# Patient Record
Sex: FEMALE | Race: WHITE | NOT HISPANIC OR LATINO
[De-identification: names, ages, dates, MRNs, and addresses within clinical notes are randomized per-mention and may not be internally consistent; named-entity substitution may affect disease eponyms.]

---

## 2017-01-05 ENCOUNTER — MEDICATION RENEWAL (OUTPATIENT)
Age: 72
End: 2017-01-05

## 2017-01-09 ENCOUNTER — MEDICATION RENEWAL (OUTPATIENT)
Age: 72
End: 2017-01-09

## 2017-01-12 ENCOUNTER — MEDICATION RENEWAL (OUTPATIENT)
Age: 72
End: 2017-01-12

## 2017-01-12 DIAGNOSIS — Z87.09 PERSONAL HISTORY OF OTHER DISEASES OF THE RESPIRATORY SYSTEM: ICD-10-CM

## 2017-01-12 RX ORDER — AZITHROMYCIN 500 MG/1
500 TABLET, FILM COATED ORAL
Qty: 5 | Refills: 0 | Status: ACTIVE | COMMUNITY
Start: 2017-01-12 | End: 1900-01-01

## 2017-02-01 ENCOUNTER — APPOINTMENT (OUTPATIENT)
Dept: PULMONOLOGY | Facility: CLINIC | Age: 72
End: 2017-02-01

## 2017-04-16 ENCOUNTER — INPATIENT (INPATIENT)
Facility: HOSPITAL | Age: 72
LOS: 1 days | Discharge: HOME | DRG: 392 | End: 2017-04-18
Attending: INTERNAL MEDICINE | Admitting: HOSPITALIST
Payer: MEDICARE

## 2017-04-16 VITALS
RESPIRATION RATE: 18 BRPM | TEMPERATURE: 97 F | HEART RATE: 86 BPM | OXYGEN SATURATION: 97 % | DIASTOLIC BLOOD PRESSURE: 92 MMHG | SYSTOLIC BLOOD PRESSURE: 154 MMHG

## 2017-04-16 DIAGNOSIS — Z41.8 ENCOUNTER FOR OTHER PROCEDURES FOR PURPOSES OTHER THAN REMEDYING HEALTH STATE: ICD-10-CM

## 2017-04-16 DIAGNOSIS — G35 MULTIPLE SCLEROSIS: ICD-10-CM

## 2017-04-16 DIAGNOSIS — R63.8 OTHER SYMPTOMS AND SIGNS CONCERNING FOOD AND FLUID INTAKE: ICD-10-CM

## 2017-04-16 DIAGNOSIS — J44.9 CHRONIC OBSTRUCTIVE PULMONARY DISEASE, UNSPECIFIED: ICD-10-CM

## 2017-04-16 DIAGNOSIS — R11.0 NAUSEA: ICD-10-CM

## 2017-04-16 LAB
ALBUMIN SERPL ELPH-MCNC: 4.1 G/DL — SIGNIFICANT CHANGE UP (ref 3.3–5)
ALP SERPL-CCNC: 85 U/L — SIGNIFICANT CHANGE UP (ref 40–120)
ALT FLD-CCNC: 9 U/L RC — LOW (ref 10–45)
ANION GAP SERPL CALC-SCNC: 13 MMOL/L — SIGNIFICANT CHANGE UP (ref 5–17)
ANION GAP SERPL CALC-SCNC: 14 MMOL/L — SIGNIFICANT CHANGE UP (ref 5–17)
APPEARANCE UR: CLEAR — SIGNIFICANT CHANGE UP
APTT BLD: 34.6 SEC — SIGNIFICANT CHANGE UP (ref 27.5–37.4)
AST SERPL-CCNC: 14 U/L — SIGNIFICANT CHANGE UP (ref 10–40)
BASOPHILS # BLD AUTO: 0 K/UL — SIGNIFICANT CHANGE UP (ref 0–0.2)
BASOPHILS NFR BLD AUTO: 0.5 % — SIGNIFICANT CHANGE UP (ref 0–2)
BILIRUB SERPL-MCNC: 0.8 MG/DL — SIGNIFICANT CHANGE UP (ref 0.2–1.2)
BILIRUB UR-MCNC: NEGATIVE — SIGNIFICANT CHANGE UP
BUN SERPL-MCNC: 11 MG/DL — SIGNIFICANT CHANGE UP (ref 7–23)
BUN SERPL-MCNC: 9 MG/DL — SIGNIFICANT CHANGE UP (ref 7–23)
CALCIUM SERPL-MCNC: 8.1 MG/DL — LOW (ref 8.4–10.5)
CALCIUM SERPL-MCNC: 9.5 MG/DL — SIGNIFICANT CHANGE UP (ref 8.4–10.5)
CHLORIDE SERPL-SCNC: 87 MMOL/L — LOW (ref 96–108)
CHLORIDE SERPL-SCNC: 92 MMOL/L — LOW (ref 96–108)
CO2 SERPL-SCNC: 23 MMOL/L — SIGNIFICANT CHANGE UP (ref 22–31)
CO2 SERPL-SCNC: 26 MMOL/L — SIGNIFICANT CHANGE UP (ref 22–31)
COLOR SPEC: YELLOW — SIGNIFICANT CHANGE UP
CREAT SERPL-MCNC: 0.43 MG/DL — LOW (ref 0.5–1.3)
CREAT SERPL-MCNC: 0.5 MG/DL — SIGNIFICANT CHANGE UP (ref 0.5–1.3)
DIFF PNL FLD: ABNORMAL
EOSINOPHIL # BLD AUTO: 0.1 K/UL — SIGNIFICANT CHANGE UP (ref 0–0.5)
EOSINOPHIL NFR BLD AUTO: 1 % — SIGNIFICANT CHANGE UP (ref 0–6)
GAS PNL BLDV: SIGNIFICANT CHANGE UP
GLUCOSE SERPL-MCNC: 76 MG/DL — SIGNIFICANT CHANGE UP (ref 70–99)
GLUCOSE SERPL-MCNC: 96 MG/DL — SIGNIFICANT CHANGE UP (ref 70–99)
GLUCOSE UR QL: NEGATIVE — SIGNIFICANT CHANGE UP
HCT VFR BLD CALC: 34.9 % — SIGNIFICANT CHANGE UP (ref 34.5–45)
HGB BLD-MCNC: 12 G/DL — SIGNIFICANT CHANGE UP (ref 11.5–15.5)
INR BLD: 1.14 RATIO — SIGNIFICANT CHANGE UP (ref 0.88–1.16)
KETONES UR-MCNC: ABNORMAL
LEUKOCYTE ESTERASE UR-ACNC: NEGATIVE — SIGNIFICANT CHANGE UP
LIDOCAIN IGE QN: 25 U/L — SIGNIFICANT CHANGE UP (ref 7–60)
LYMPHOCYTES # BLD AUTO: 0.8 K/UL — LOW (ref 1–3.3)
LYMPHOCYTES # BLD AUTO: 12.2 % — LOW (ref 13–44)
MCHC RBC-ENTMCNC: 31.5 PG — SIGNIFICANT CHANGE UP (ref 27–34)
MCHC RBC-ENTMCNC: 34.4 GM/DL — SIGNIFICANT CHANGE UP (ref 32–36)
MCV RBC AUTO: 91.6 FL — SIGNIFICANT CHANGE UP (ref 80–100)
MONOCYTES # BLD AUTO: 0.4 K/UL — SIGNIFICANT CHANGE UP (ref 0–0.9)
MONOCYTES NFR BLD AUTO: 5.8 % — SIGNIFICANT CHANGE UP (ref 2–14)
NEUTROPHILS # BLD AUTO: 5.3 K/UL — SIGNIFICANT CHANGE UP (ref 1.8–7.4)
NEUTROPHILS NFR BLD AUTO: 80.4 % — HIGH (ref 43–77)
NITRITE UR-MCNC: NEGATIVE — SIGNIFICANT CHANGE UP
PH UR: 6.5 — SIGNIFICANT CHANGE UP (ref 4.8–8)
PLATELET # BLD AUTO: 292 K/UL — SIGNIFICANT CHANGE UP (ref 150–400)
POTASSIUM SERPL-MCNC: 3.6 MMOL/L — SIGNIFICANT CHANGE UP (ref 3.5–5.3)
POTASSIUM SERPL-MCNC: 4.4 MMOL/L — SIGNIFICANT CHANGE UP (ref 3.5–5.3)
POTASSIUM SERPL-SCNC: 3.6 MMOL/L — SIGNIFICANT CHANGE UP (ref 3.5–5.3)
POTASSIUM SERPL-SCNC: 4.4 MMOL/L — SIGNIFICANT CHANGE UP (ref 3.5–5.3)
PROT SERPL-MCNC: 7.8 G/DL — SIGNIFICANT CHANGE UP (ref 6–8.3)
PROT UR-MCNC: SIGNIFICANT CHANGE UP
PROTHROM AB SERPL-ACNC: 12.5 SEC — SIGNIFICANT CHANGE UP (ref 9.8–12.7)
RBC # BLD: 3.81 M/UL — SIGNIFICANT CHANGE UP (ref 3.8–5.2)
RBC # FLD: 13.7 % — SIGNIFICANT CHANGE UP (ref 10.3–14.5)
SODIUM SERPL-SCNC: 127 MMOL/L — LOW (ref 135–145)
SODIUM SERPL-SCNC: 128 MMOL/L — LOW (ref 135–145)
SP GR SPEC: 1.02 — SIGNIFICANT CHANGE UP (ref 1.01–1.02)
UROBILINOGEN FLD QL: NEGATIVE — SIGNIFICANT CHANGE UP
WBC # BLD: 6.6 K/UL — SIGNIFICANT CHANGE UP (ref 3.8–10.5)
WBC # FLD AUTO: 6.6 K/UL — SIGNIFICANT CHANGE UP (ref 3.8–10.5)

## 2017-04-16 PROCEDURE — 93010 ELECTROCARDIOGRAM REPORT: CPT

## 2017-04-16 PROCEDURE — 99285 EMERGENCY DEPT VISIT HI MDM: CPT | Mod: 25

## 2017-04-16 RX ORDER — METOCLOPRAMIDE HCL 10 MG
10 TABLET ORAL ONCE
Qty: 0 | Refills: 0 | Status: COMPLETED | OUTPATIENT
Start: 2017-04-16 | End: 2017-04-16

## 2017-04-16 RX ORDER — SODIUM CHLORIDE 9 MG/ML
1000 INJECTION INTRAMUSCULAR; INTRAVENOUS; SUBCUTANEOUS ONCE
Qty: 0 | Refills: 0 | Status: COMPLETED | OUTPATIENT
Start: 2017-04-16 | End: 2017-04-16

## 2017-04-16 RX ORDER — FLUTICASONE FUROATE AND VILANTEROL TRIFENATATE 100; 25 UG/1; UG/1
1 POWDER RESPIRATORY (INHALATION) DAILY
Qty: 0 | Refills: 0 | Status: DISCONTINUED | OUTPATIENT
Start: 2017-04-16 | End: 2017-04-18

## 2017-04-16 RX ORDER — ACETAMINOPHEN 500 MG
1000 TABLET ORAL ONCE
Qty: 0 | Refills: 0 | Status: COMPLETED | OUTPATIENT
Start: 2017-04-16 | End: 2017-04-16

## 2017-04-16 RX ORDER — ONDANSETRON 8 MG/1
4 TABLET, FILM COATED ORAL EVERY 8 HOURS
Qty: 0 | Refills: 0 | Status: DISCONTINUED | OUTPATIENT
Start: 2017-04-16 | End: 2017-04-18

## 2017-04-16 RX ORDER — SODIUM CHLORIDE 9 MG/ML
1000 INJECTION, SOLUTION INTRAVENOUS
Qty: 0 | Refills: 0 | Status: DISCONTINUED | OUTPATIENT
Start: 2017-04-16 | End: 2017-04-16

## 2017-04-16 RX ORDER — ENOXAPARIN SODIUM 100 MG/ML
30 INJECTION SUBCUTANEOUS DAILY
Qty: 0 | Refills: 0 | Status: DISCONTINUED | OUTPATIENT
Start: 2017-04-16 | End: 2017-04-18

## 2017-04-16 RX ORDER — GABAPENTIN 400 MG/1
1200 CAPSULE ORAL THREE TIMES A DAY
Qty: 0 | Refills: 0 | Status: DISCONTINUED | OUTPATIENT
Start: 2017-04-16 | End: 2017-04-17

## 2017-04-16 RX ORDER — GABAPENTIN 400 MG/1
1800 CAPSULE ORAL
Qty: 0 | Refills: 0 | Status: DISCONTINUED | OUTPATIENT
Start: 2017-04-16 | End: 2017-04-16

## 2017-04-16 RX ORDER — SODIUM CHLORIDE 9 MG/ML
1000 INJECTION, SOLUTION INTRAVENOUS
Qty: 0 | Refills: 0 | Status: DISCONTINUED | OUTPATIENT
Start: 2017-04-16 | End: 2017-04-17

## 2017-04-16 RX ORDER — DIPHENHYDRAMINE HCL 50 MG
25 CAPSULE ORAL ONCE
Qty: 0 | Refills: 0 | Status: COMPLETED | OUTPATIENT
Start: 2017-04-16 | End: 2017-04-16

## 2017-04-16 RX ORDER — ONDANSETRON 8 MG/1
4 TABLET, FILM COATED ORAL ONCE
Qty: 0 | Refills: 0 | Status: COMPLETED | OUTPATIENT
Start: 2017-04-16 | End: 2017-04-16

## 2017-04-16 RX ADMIN — Medication 10 MILLIGRAM(S): at 19:01

## 2017-04-16 RX ADMIN — SODIUM CHLORIDE 1000 MILLILITER(S): 9 INJECTION INTRAMUSCULAR; INTRAVENOUS; SUBCUTANEOUS at 16:28

## 2017-04-16 RX ADMIN — ENOXAPARIN SODIUM 30 MILLIGRAM(S): 100 INJECTION SUBCUTANEOUS at 23:11

## 2017-04-16 RX ADMIN — Medication 400 MILLIGRAM(S): at 19:01

## 2017-04-16 RX ADMIN — SODIUM CHLORIDE 100 MILLILITER(S): 9 INJECTION, SOLUTION INTRAVENOUS at 23:46

## 2017-04-16 RX ADMIN — Medication 1000 MILLIGRAM(S): at 19:18

## 2017-04-16 RX ADMIN — SODIUM CHLORIDE 1000 MILLILITER(S): 9 INJECTION INTRAMUSCULAR; INTRAVENOUS; SUBCUTANEOUS at 19:02

## 2017-04-16 RX ADMIN — SODIUM CHLORIDE 100 MILLILITER(S): 9 INJECTION, SOLUTION INTRAVENOUS at 21:02

## 2017-04-16 RX ADMIN — Medication 101 MILLIGRAM(S): at 19:02

## 2017-04-16 RX ADMIN — ONDANSETRON 4 MILLIGRAM(S): 8 TABLET, FILM COATED ORAL at 23:38

## 2017-04-16 RX ADMIN — ONDANSETRON 4 MILLIGRAM(S): 8 TABLET, FILM COATED ORAL at 19:01

## 2017-04-16 RX ADMIN — ONDANSETRON 4 MILLIGRAM(S): 8 TABLET, FILM COATED ORAL at 19:02

## 2017-04-16 RX ADMIN — GABAPENTIN 1200 MILLIGRAM(S): 400 CAPSULE ORAL at 23:10

## 2017-04-16 NOTE — H&P ADULT. - PROBLEM SELECTOR PLAN 1
unclear etiology. Nausea seems to be intermittent since patient's lung cancer diagnosis and treatments. Gastrointestinal pathologies have been worked up with EGD, CT A/P emptying study. Central pathologies also worked up in past with brain imaging. Question if related to MS? vs post lung cancer tx, cancer nausea  -s/p NS boluses, continue with intravenous hydration  -check BMP for sodium in AM  -continue with zofran ATC  -consult patient's GI Dr. Spring in AM  -benign abdominal exam. doubt intra-abdominal pathology  -pt with soft stools, though not significant intake. would monitor stool count. low suspicion for infectious diarrhea at the moment

## 2017-04-16 NOTE — ED PROVIDER NOTE - MEDICAL DECISION MAKING DETAILS
71 yo female with history of chronic nausea worked up by GI without overt etiology p/w N/V/D x 1d.  Abdomen benign.  Will give IVF, check labs, reassess; suspect viral gastroenteritis exacerbating baseline chronic indigestion/nausea.

## 2017-04-16 NOTE — H&P ADULT. - LAB RESULTS AND INTERPRETATION
WBC 6.6  Hgb&Hct 12.0/34.9  BUN/creatinine 11/0.50  Lipase 25  UA large ketone, small blood, 2-5 WBC, few bacteria

## 2017-04-16 NOTE — H&P ADULT. - HISTORY OF PRESENT ILLNESS
71 y/o F hx of lung cancer s/p RLL lobectomy, RT and chemotherapy (several years ago), COPD, MS not on any treatment presents with one day of nausea.     She reports that she has had several episodes of nausea intermittently of the last few years. She endorses dry heaves intermittently since early this morning. Denies any PO intake. No vomiting. She endorses several episodes of soft stools over the course of the day. Described as soft, not watery.     No fevers, chills, or sweats. She denies sick contacts or recent illness. Endorses 10 pound weight loss over the last year or so, very progressive. She reports that she has had extensive work up including CT A/P which was nonrevealing. She has also had endoscopy, NM gastric emptying study where were also reportedly within normal limits.     She reports that she has been seeing a nutritionist in regards to appropriate foods to eat.     ED Vitals:  Tc 97.7, HR 81-91, -156/84-92, RR 19-20, 98 RA  In the ED, she received Tylenol IV, Benadryl 25, Zofran 4mg x 2, Reglan 10mg x 2, NS bolus x 2

## 2017-04-16 NOTE — ED ADULT NURSE NOTE - PSH
Age related cataract    H/O benign breast biopsy    History of radiation therapy  to chest for lung cancer  S/P lobectomy of lung  right lower lobectomy

## 2017-04-16 NOTE — ED PROVIDER NOTE - PROGRESS NOTE DETAILS
Spoke with Dr. Spring who states that he has performed an extensive work-up on patient and all results have been within normal limits. Dr. Spring reports he agrees with plan to hydrate patient and PO challenge for discharge and follow-up with Dr. Spring tomorrow. -Shaista Malik PA-C Attending MD Moscoso: patient received in signout from Dr Amos pending PO challenge, patient unable to tolerate PO, still with nausea. benign abdominal exam so do not suspect acute surgical pathology, will admit for intractable nausea and failure to tolerate PO

## 2017-04-16 NOTE — ED PROVIDER NOTE - PHYSICAL EXAMINATION
GEN: Well Appearing, Nontoxic, NAD  HEENT: Symm Facies, PERRL, EOMI, MMM, posterior pharynx clear  CV: No JVD/Bruits or stridor;  RRR w/o m/g/r  RESP: CTAB w/o w/r/r  ABD: Soft, nt/nd, +bs, no guarding/rebound, no RUQ tender;  No CVAT  EXT: No lower extremity edema or calf tenderness. WWP, palpable pulses. FROMx4  SKIN: No erythema, lesions or rash  Neuro: Grossly intact, AOX3 with normal speech, CN II-XII intact; Sensation intact, motor 5/5 throughout; gait normal

## 2017-04-16 NOTE — ED PROVIDER NOTE - NS ED ROS FT
Constitutional: No fever or chills  Eyes: No visual changes, eye pain or redness  HEENT: No throat pain, ear pain, nasal pain. No nose bleeding.  CV: No chest pain or lower extremity edema  Resp: No SOB no cough  GI: +mild abdominal pain, dull. +nausea. no vomiting. +3 small episodes of diarrhea this morning. No constipation.   : No dysuria, hematuria.   MSK: No musculoskeletal pain  Skin: No rash  Neuro: No headache. No numbness or tingling. No weakness.

## 2017-04-16 NOTE — PROVIDER CONTACT NOTE (OTHER) - ASSESSMENT
pt axox4. able to make desicisions. pt stated she is DNR and has filled form out in past. no current record in chart.

## 2017-04-16 NOTE — ED PROVIDER NOTE - OBJECTIVE STATEMENT
73 y/o F pmhx MS, COPD, lung cancer s/p partial RLL lobectomy and RT, presenting to ED for nausea and dry heaving. Pt reports that she has seen Dr. John Spring for multiple gastrointestinal issues, 'through all testing have been unable to find a diagnosis,' as patient frequently has nausea and vague abdominal cramping/pain. She states that the symptoms she is having today are different from her baseline symptoms, as 'her nausea usually does not last this long' and she has been dry-heaving. Reports 3-4 episodes of diarrhea this morning; small amounts each time, no blood. She denies any vomiting. States that she ate salad last night with mozzarella for the first time in months because 'her nutritionist told her to avoid salad and soup but recently told her she could try to have it again.' She denies any other new foods, recent travel or known sick contacts. Denies fevers, chills. 71 y/o F pmhx MS, COPD, lung cancer s/p partial RLL lobectomy and RT, presenting to ED for nausea and dry heaving. Pt reports that she has seen Dr. John Spring for multiple gastrointestinal issues, 'through all testing have been unable to find a diagnosis,' as patient frequently has nausea and vague abdominal cramping/pain. Has had extensive work-up with Dr. Spring. She states that the symptoms she is having today are different from her baseline symptoms, as 'her nausea usually does not last this long' and she has been dry-heaving. Reports 3-4 episodes of diarrhea this morning; small amounts each time, no blood. She denies any vomiting. States that she ate salad last night with mozzarella for the first time in months because 'her nutritionist told her to avoid salad and soup but recently told her she could try to have it again.' She denies any other new foods, recent travel or known sick contacts. Denies fevers, chills.

## 2017-04-16 NOTE — ED PROVIDER NOTE - INTERPRETATION
EKG reviewed for rate, rhythm, axis, intervals and segments, including QRS morphology, P wave appearance T wave appearance, RI interval, and QT interval.  I find the EKG to be unremarkable in all of these regards except as follows: 1st deg AVB, L axis, poor R progression

## 2017-04-16 NOTE — ED ADULT NURSE NOTE - OBJECTIVE STATEMENT
pt presents with nausea and abd discomfort since this am, pt states, "I was dry heaving however did not vomit", abd soft, NT, ND, pos diarrhea upon waking, poor appetite, pt A;Ox3, no acute resp distress noted, v/s stable, voids freely, no UTI symptoms noted, ambulates with assistance due to feeling of weakness, safety precautions maintained, fam.mem at bedside, MD byrd, labs sent, will continue to monitor

## 2017-04-17 ENCOUNTER — TRANSCRIPTION ENCOUNTER (OUTPATIENT)
Age: 72
End: 2017-04-17

## 2017-04-17 LAB
ALBUMIN SERPL ELPH-MCNC: 3.2 G/DL — LOW (ref 3.3–5)
ALP SERPL-CCNC: 67 U/L — SIGNIFICANT CHANGE UP (ref 40–120)
ALT FLD-CCNC: 11 U/L — SIGNIFICANT CHANGE UP (ref 10–45)
ANION GAP SERPL CALC-SCNC: 14 MMOL/L — SIGNIFICANT CHANGE UP (ref 5–17)
AST SERPL-CCNC: 13 U/L — SIGNIFICANT CHANGE UP (ref 10–40)
BILIRUB SERPL-MCNC: 0.3 MG/DL — SIGNIFICANT CHANGE UP (ref 0.2–1.2)
BUN SERPL-MCNC: 6 MG/DL — LOW (ref 7–23)
CALCIUM SERPL-MCNC: 8.5 MG/DL — SIGNIFICANT CHANGE UP (ref 8.4–10.5)
CHLORIDE SERPL-SCNC: 96 MMOL/L — SIGNIFICANT CHANGE UP (ref 96–108)
CO2 SERPL-SCNC: 21 MMOL/L — LOW (ref 22–31)
CREAT SERPL-MCNC: 0.48 MG/DL — LOW (ref 0.5–1.3)
CULTURE RESULTS: NO GROWTH — SIGNIFICANT CHANGE UP
GLUCOSE SERPL-MCNC: 104 MG/DL — HIGH (ref 70–99)
HCT VFR BLD CALC: 31.5 % — LOW (ref 34.5–45)
HGB BLD-MCNC: 10.4 G/DL — LOW (ref 11.5–15.5)
MCHC RBC-ENTMCNC: 29.5 PG — SIGNIFICANT CHANGE UP (ref 27–34)
MCHC RBC-ENTMCNC: 33 GM/DL — SIGNIFICANT CHANGE UP (ref 32–36)
MCV RBC AUTO: 89.2 FL — SIGNIFICANT CHANGE UP (ref 80–100)
PLATELET # BLD AUTO: 299 K/UL — SIGNIFICANT CHANGE UP (ref 150–400)
POTASSIUM SERPL-MCNC: 3.4 MMOL/L — LOW (ref 3.5–5.3)
POTASSIUM SERPL-SCNC: 3.4 MMOL/L — LOW (ref 3.5–5.3)
PROT SERPL-MCNC: 6.4 G/DL — SIGNIFICANT CHANGE UP (ref 6–8.3)
RBC # BLD: 3.53 M/UL — LOW (ref 3.8–5.2)
RBC # FLD: 14 % — SIGNIFICANT CHANGE UP (ref 10.3–14.5)
SODIUM SERPL-SCNC: 131 MMOL/L — LOW (ref 135–145)
SPECIMEN SOURCE: SIGNIFICANT CHANGE UP
WBC # BLD: 3.99 K/UL — SIGNIFICANT CHANGE UP (ref 3.8–10.5)
WBC # FLD AUTO: 3.99 K/UL — SIGNIFICANT CHANGE UP (ref 3.8–10.5)

## 2017-04-17 RX ORDER — SODIUM CHLORIDE 9 MG/ML
1000 INJECTION, SOLUTION INTRAVENOUS
Qty: 0 | Refills: 0 | Status: DISCONTINUED | OUTPATIENT
Start: 2017-04-17 | End: 2017-04-17

## 2017-04-17 RX ORDER — ACETAMINOPHEN 500 MG
650 TABLET ORAL EVERY 6 HOURS
Qty: 0 | Refills: 0 | Status: DISCONTINUED | OUTPATIENT
Start: 2017-04-17 | End: 2017-04-18

## 2017-04-17 RX ORDER — GABAPENTIN 400 MG/1
1800 CAPSULE ORAL
Qty: 0 | Refills: 0 | Status: DISCONTINUED | OUTPATIENT
Start: 2017-04-17 | End: 2017-04-18

## 2017-04-17 RX ORDER — POTASSIUM CHLORIDE 20 MEQ
40 PACKET (EA) ORAL ONCE
Qty: 0 | Refills: 0 | Status: COMPLETED | OUTPATIENT
Start: 2017-04-17 | End: 2017-04-17

## 2017-04-17 RX ORDER — GABAPENTIN 400 MG/1
1200 CAPSULE ORAL THREE TIMES A DAY
Qty: 0 | Refills: 0 | Status: DISCONTINUED | OUTPATIENT
Start: 2017-04-17 | End: 2017-04-17

## 2017-04-17 RX ORDER — GABAPENTIN 400 MG/1
3 CAPSULE ORAL
Qty: 0 | Refills: 0 | COMMUNITY
Start: 2017-04-17

## 2017-04-17 RX ADMIN — Medication 650 MILLIGRAM(S): at 12:47

## 2017-04-17 RX ADMIN — Medication 40 MILLIEQUIVALENT(S): at 18:04

## 2017-04-17 RX ADMIN — GABAPENTIN 1800 MILLIGRAM(S): 400 CAPSULE ORAL at 10:22

## 2017-04-17 RX ADMIN — FLUTICASONE FUROATE AND VILANTEROL TRIFENATATE 1 PUFF(S): 100; 25 POWDER RESPIRATORY (INHALATION) at 18:02

## 2017-04-17 RX ADMIN — Medication 650 MILLIGRAM(S): at 10:30

## 2017-04-17 RX ADMIN — GABAPENTIN 1800 MILLIGRAM(S): 400 CAPSULE ORAL at 22:00

## 2017-04-17 RX ADMIN — ENOXAPARIN SODIUM 30 MILLIGRAM(S): 100 INJECTION SUBCUTANEOUS at 14:31

## 2017-04-17 NOTE — PROVIDER CONTACT NOTE (MEDICATION) - ASSESSMENT
pt axox4. VSS. pt taking gabapentin suspension, pt states that she becomes more nauseous when taking suspension and usually takes capsules.

## 2017-04-17 NOTE — DIETITIAN INITIAL EVALUATION ADULT. - FACTORS AFF FOOD INTAKE
Pt reports taste and food preference changes since receiving chemotherapy. Pt with intermittent nausea./change in sense of smell or taste

## 2017-04-17 NOTE — DIETITIAN INITIAL EVALUATION ADULT. - ENERGY NEEDS
ht.59inches wt. 79.5pounds BMI:16.07 kg/m2 IBW: 98pounds (+/-10%) %IBW: 81%  Pt is 71 yo F with hx of COPD, lung cancer, MS, p/w intractable nausea of unclear etiology x1 day.  No Edema noted  No Pressure Ulcers noted

## 2017-04-17 NOTE — DIETITIAN INITIAL EVALUATION ADULT. - OTHER INFO
Pt seen for unintentional weight loss and N/V PTA. Pt reports UBW of 88-90pounds at last doctor's visit 1 month ago. Pt with 10% wt loss in one month. Pt stated her pants are larger as UBW previously 105 pounds prior to becoming sick. Pt reports she did not consume food after she became nauseas on Saturday afternoon after consuming a salad with buttered Italian bread. Pt reports having worse nausea Sunday which led to her admission 4/16. Pt stated she has intermittent nausea with decreased PO intake until it passes. Pt reports nausea earlier this morning after consuming beef broth, tea, juice, and lemon ice, not nauseas at time of visit. Pt denies any emesis. Pt reports baseline constipation that has improved with increase of fiber intake. Pt reports loose stool while in house. Pt with BMx3 today. Pt denies any chewing/swallowing difficulties.

## 2017-04-17 NOTE — DISCHARGE NOTE ADULT - HOSPITAL COURSE
md 71 y/o F hx of lung cancer s/p RLL lobectomy, RT and chemotherapy (several years ago), COPD, MS not on any treatment presents with one day of nausea. She endorses dry heaves intermittently since early this morning.     Dx intractable nausea unclear etiology   Course:   #Hyponatremia :likely cause for acute on chronic nausea. Na 127 on admission improved with ivNS. likely dilutional, either hypovolemic/euvolemic, pt with absolutely no salt intake in diet. advised to increase salt in diet  antiemetics given atc with improvement in nausea  pt to f/u pcp and rpt bmp in 1wk and if persistant hyponatremia-consider furthur w/u    #Nausea seems to be intermittent since patient's lung cancer diagnosis and treatments. Gastrointestinal pathologies have been worked up with EGD, CT A/P and emptying study. Central pathologies also worked up in past with brain imaging.   out pt Gi f/u    pt clinically improved and dced with outpt pcp and gi f/u

## 2017-04-17 NOTE — DISCHARGE NOTE ADULT - MEDICATION SUMMARY - MEDICATIONS TO TAKE
I will START or STAY ON the medications listed below when I get home from the hospital:    gabapentin 600 mg oral tablet  -- 3 tab(s) by mouth 2 times a day  -- Indication: For pain    ondansetron 4 mg oral tablet  -- 1 tab(s) by mouth every 8 hours, As Needed  -- Indication: For Nausea    Breo Ellipta 100 mcg-25 mcg/inh inhalation powder  -- 1 puff(s) inhaled once a day  -- Indication: For COPD (chronic obstructive pulmonary disease)    Os-Adán 500 + D  -- 1 tab(s) by mouth once a day  -- Indication: For supplement    Vitamin D3 1000 intl units oral tablet  -- 1 tab(s) by mouth once a day  -- Indication: For supplement

## 2017-04-17 NOTE — DIETITIAN INITIAL EVALUATION ADULT. - NS AS NUTRI INTERV ED CONTENT
Discussed with pt increasing intake through supplements and snacks. Pt to follow up with outside nutritionist after discharge. Discussed consuming caloric dense foods first to promote good caloric intake.

## 2017-04-17 NOTE — DIETITIAN INITIAL EVALUATION ADULT. - ORAL INTAKE PTA
Pt reports seeing a nutritionist PTA. Pt reports consuming 3meals+1snack/day. Breakfast unusually consists of oatmeal or raisin brain and coffee. Pt reports lunch of turkey and cheese on 1 slice whole wheat bread with a fruit cup or applesauce. Pt's dinner consists of fish or chicken, a starch, and a vegetable. Snack usually consists of pretzels, fig newtons, peanut butter, or prunes. Per pt and pt's daughter, pt consumes small portions. Pt is lactose intolerant and drinks 2% lactacid milk. Pt drinks water and juice. Pt reports trying boost high protein, but didn't like it. Pt reports taking a calcium+Vitamin D3 and additional Vitamin D3. Pt reports shellfish allergy with stomach pains lasting 4 hours./fair

## 2017-04-17 NOTE — DIETITIAN INITIAL EVALUATION ADULT. - ADHERENCE
Pt reports per outside nutritionist, pt avoids consuming soups and salads. She also eats vegetables last at meals./fair

## 2017-04-17 NOTE — DIETITIAN INITIAL EVALUATION ADULT. - NUTRITION INTERVENTION
Meals and Snack/Medical Food Supplements/Collaboration and Referral of Nutrition Care/Nutrition Education

## 2017-04-17 NOTE — DISCHARGE NOTE ADULT - CARE PLAN
Principal Discharge DX:	Nausea  Goal:	Free from reoccurrence of symptoms  Instructions for follow-up, activity and diet:	Resolved  Secondary Diagnosis:	MS (multiple sclerosis)  Instructions for follow-up, activity and diet:	Followup with your doctor  Secondary Diagnosis:	COPD (chronic obstructive pulmonary disease)  Instructions for follow-up, activity and diet:	Continue with your inhaler

## 2017-04-17 NOTE — DISCHARGE NOTE ADULT - CARE PROVIDERS DIRECT ADDRESSES
,[email or web address],DirectAddress_Unknown,DirectAddress_Unknown

## 2017-04-17 NOTE — DIETITIAN INITIAL EVALUATION ADULT. - NS AS NUTRI INTERV MEDICAL AND FOOD SUPPLEMENTS
Pt open to trying different flavors of Ensure Enlive, recommend Ensure Enlive 3x/day. Discussed ways to encourage intake, freezing ensure or chilling ensure./Commercial beverage

## 2017-04-18 VITALS
TEMPERATURE: 98 F | RESPIRATION RATE: 18 BRPM | DIASTOLIC BLOOD PRESSURE: 71 MMHG | OXYGEN SATURATION: 94 % | SYSTOLIC BLOOD PRESSURE: 125 MMHG | HEART RATE: 77 BPM

## 2017-04-18 LAB
ANION GAP SERPL CALC-SCNC: 12 MMOL/L — SIGNIFICANT CHANGE UP (ref 5–17)
BUN SERPL-MCNC: 5 MG/DL — LOW (ref 7–23)
CALCIUM SERPL-MCNC: 8.9 MG/DL — SIGNIFICANT CHANGE UP (ref 8.4–10.5)
CHLORIDE SERPL-SCNC: 97 MMOL/L — SIGNIFICANT CHANGE UP (ref 96–108)
CO2 SERPL-SCNC: 22 MMOL/L — SIGNIFICANT CHANGE UP (ref 22–31)
CREAT SERPL-MCNC: 0.56 MG/DL — SIGNIFICANT CHANGE UP (ref 0.5–1.3)
GLUCOSE SERPL-MCNC: 76 MG/DL — SIGNIFICANT CHANGE UP (ref 70–99)
MAGNESIUM SERPL-MCNC: 1.6 MG/DL — SIGNIFICANT CHANGE UP (ref 1.6–2.6)
POTASSIUM SERPL-MCNC: 4.5 MMOL/L — SIGNIFICANT CHANGE UP (ref 3.5–5.3)
POTASSIUM SERPL-SCNC: 4.5 MMOL/L — SIGNIFICANT CHANGE UP (ref 3.5–5.3)
SODIUM SERPL-SCNC: 131 MMOL/L — LOW (ref 135–145)

## 2017-04-18 PROCEDURE — 82947 ASSAY GLUCOSE BLOOD QUANT: CPT

## 2017-04-18 PROCEDURE — 83605 ASSAY OF LACTIC ACID: CPT

## 2017-04-18 PROCEDURE — 83735 ASSAY OF MAGNESIUM: CPT

## 2017-04-18 PROCEDURE — 81001 URINALYSIS AUTO W/SCOPE: CPT

## 2017-04-18 PROCEDURE — 99285 EMERGENCY DEPT VISIT HI MDM: CPT | Mod: 25

## 2017-04-18 PROCEDURE — 80053 COMPREHEN METABOLIC PANEL: CPT

## 2017-04-18 PROCEDURE — 93005 ELECTROCARDIOGRAM TRACING: CPT

## 2017-04-18 PROCEDURE — 82330 ASSAY OF CALCIUM: CPT

## 2017-04-18 PROCEDURE — 84295 ASSAY OF SERUM SODIUM: CPT

## 2017-04-18 PROCEDURE — 82803 BLOOD GASES ANY COMBINATION: CPT

## 2017-04-18 PROCEDURE — 85730 THROMBOPLASTIN TIME PARTIAL: CPT

## 2017-04-18 PROCEDURE — 96374 THER/PROPH/DIAG INJ IV PUSH: CPT

## 2017-04-18 PROCEDURE — 82435 ASSAY OF BLOOD CHLORIDE: CPT

## 2017-04-18 PROCEDURE — 85610 PROTHROMBIN TIME: CPT

## 2017-04-18 PROCEDURE — 96375 TX/PRO/DX INJ NEW DRUG ADDON: CPT

## 2017-04-18 PROCEDURE — 84132 ASSAY OF SERUM POTASSIUM: CPT

## 2017-04-18 PROCEDURE — 85014 HEMATOCRIT: CPT

## 2017-04-18 PROCEDURE — 83690 ASSAY OF LIPASE: CPT

## 2017-04-18 PROCEDURE — 80048 BASIC METABOLIC PNL TOTAL CA: CPT

## 2017-04-18 PROCEDURE — 87086 URINE CULTURE/COLONY COUNT: CPT

## 2017-04-18 PROCEDURE — 94640 AIRWAY INHALATION TREATMENT: CPT

## 2017-04-18 PROCEDURE — 85027 COMPLETE CBC AUTOMATED: CPT

## 2017-04-18 RX ADMIN — FLUTICASONE FUROATE AND VILANTEROL TRIFENATATE 1 PUFF(S): 100; 25 POWDER RESPIRATORY (INHALATION) at 11:00

## 2017-04-18 RX ADMIN — GABAPENTIN 1800 MILLIGRAM(S): 400 CAPSULE ORAL at 09:29

## 2017-07-29 ENCOUNTER — INPATIENT (INPATIENT)
Facility: HOSPITAL | Age: 72
LOS: 1 days | Discharge: HOME | DRG: 391 | End: 2017-07-31
Attending: INTERNAL MEDICINE | Admitting: INTERNAL MEDICINE
Payer: MEDICARE

## 2017-07-29 VITALS
TEMPERATURE: 98 F | HEART RATE: 95 BPM | SYSTOLIC BLOOD PRESSURE: 151 MMHG | RESPIRATION RATE: 20 BRPM | OXYGEN SATURATION: 97 % | DIASTOLIC BLOOD PRESSURE: 78 MMHG

## 2017-07-29 DIAGNOSIS — E87.1 HYPO-OSMOLALITY AND HYPONATREMIA: ICD-10-CM

## 2017-07-29 DIAGNOSIS — E86.0 DEHYDRATION: ICD-10-CM

## 2017-07-29 DIAGNOSIS — G35 MULTIPLE SCLEROSIS: ICD-10-CM

## 2017-07-29 DIAGNOSIS — R11.0 NAUSEA: ICD-10-CM

## 2017-07-29 DIAGNOSIS — J44.9 CHRONIC OBSTRUCTIVE PULMONARY DISEASE, UNSPECIFIED: ICD-10-CM

## 2017-07-29 DIAGNOSIS — C34.90 MALIGNANT NEOPLASM OF UNSPECIFIED PART OF UNSPECIFIED BRONCHUS OR LUNG: ICD-10-CM

## 2017-07-29 LAB
ALBUMIN SERPL ELPH-MCNC: 3.9 G/DL — SIGNIFICANT CHANGE UP (ref 3.3–5)
ALP SERPL-CCNC: 73 U/L — SIGNIFICANT CHANGE UP (ref 40–120)
ALT FLD-CCNC: 7 U/L RC — LOW (ref 10–45)
ANION GAP SERPL CALC-SCNC: 16 MMOL/L — SIGNIFICANT CHANGE UP (ref 5–17)
ANION GAP SERPL CALC-SCNC: 18 MMOL/L — HIGH (ref 5–17)
APPEARANCE UR: CLEAR — SIGNIFICANT CHANGE UP
AST SERPL-CCNC: 15 U/L — SIGNIFICANT CHANGE UP (ref 10–40)
BASOPHILS # BLD AUTO: 0 K/UL — SIGNIFICANT CHANGE UP (ref 0–0.2)
BASOPHILS NFR BLD AUTO: 0 % — SIGNIFICANT CHANGE UP (ref 0–2)
BILIRUB SERPL-MCNC: 0.6 MG/DL — SIGNIFICANT CHANGE UP (ref 0.2–1.2)
BILIRUB UR-MCNC: NEGATIVE — SIGNIFICANT CHANGE UP
BUN SERPL-MCNC: 5 MG/DL — LOW (ref 7–23)
BUN SERPL-MCNC: 9 MG/DL — SIGNIFICANT CHANGE UP (ref 7–23)
CALCIUM SERPL-MCNC: 8.3 MG/DL — LOW (ref 8.4–10.5)
CALCIUM SERPL-MCNC: 8.7 MG/DL — SIGNIFICANT CHANGE UP (ref 8.4–10.5)
CHLORIDE SERPL-SCNC: 87 MMOL/L — LOW (ref 96–108)
CHLORIDE SERPL-SCNC: 88 MMOL/L — LOW (ref 96–108)
CO2 SERPL-SCNC: 20 MMOL/L — LOW (ref 22–31)
CO2 SERPL-SCNC: 21 MMOL/L — LOW (ref 22–31)
COLOR SPEC: SIGNIFICANT CHANGE UP
CREAT SERPL-MCNC: 0.54 MG/DL — SIGNIFICANT CHANGE UP (ref 0.5–1.3)
CREAT SERPL-MCNC: 0.55 MG/DL — SIGNIFICANT CHANGE UP (ref 0.5–1.3)
DIFF PNL FLD: NEGATIVE — SIGNIFICANT CHANGE UP
EOSINOPHIL # BLD AUTO: 0.1 K/UL — SIGNIFICANT CHANGE UP (ref 0–0.5)
EOSINOPHIL NFR BLD AUTO: 1.1 % — SIGNIFICANT CHANGE UP (ref 0–6)
GAS PNL BLDV: SIGNIFICANT CHANGE UP
GLUCOSE SERPL-MCNC: 238 MG/DL — HIGH (ref 70–99)
GLUCOSE SERPL-MCNC: 89 MG/DL — SIGNIFICANT CHANGE UP (ref 70–99)
GLUCOSE UR QL: NEGATIVE — SIGNIFICANT CHANGE UP
HCT VFR BLD CALC: 35.1 % — SIGNIFICANT CHANGE UP (ref 34.5–45)
HGB BLD-MCNC: 11.6 G/DL — SIGNIFICANT CHANGE UP (ref 11.5–15.5)
KETONES UR-MCNC: ABNORMAL
LEUKOCYTE ESTERASE UR-ACNC: NEGATIVE — SIGNIFICANT CHANGE UP
LYMPHOCYTES # BLD AUTO: 0.6 K/UL — LOW (ref 1–3.3)
LYMPHOCYTES # BLD AUTO: 13.2 % — SIGNIFICANT CHANGE UP (ref 13–44)
MAGNESIUM SERPL-MCNC: 2 MG/DL — SIGNIFICANT CHANGE UP (ref 1.6–2.6)
MCHC RBC-ENTMCNC: 29.1 PG — SIGNIFICANT CHANGE UP (ref 27–34)
MCHC RBC-ENTMCNC: 32.9 GM/DL — SIGNIFICANT CHANGE UP (ref 32–36)
MCV RBC AUTO: 88.3 FL — SIGNIFICANT CHANGE UP (ref 80–100)
MONOCYTES # BLD AUTO: 0.3 K/UL — SIGNIFICANT CHANGE UP (ref 0–0.9)
MONOCYTES NFR BLD AUTO: 5.6 % — SIGNIFICANT CHANGE UP (ref 2–14)
NEUTROPHILS # BLD AUTO: 3.9 K/UL — SIGNIFICANT CHANGE UP (ref 1.8–7.4)
NEUTROPHILS NFR BLD AUTO: 80.1 % — HIGH (ref 43–77)
NITRITE UR-MCNC: NEGATIVE — SIGNIFICANT CHANGE UP
PH UR: 7.5 — SIGNIFICANT CHANGE UP (ref 5–8)
PHOSPHATE SERPL-MCNC: 2.4 MG/DL — LOW (ref 2.5–4.5)
PLATELET # BLD AUTO: 322 K/UL — SIGNIFICANT CHANGE UP (ref 150–400)
POTASSIUM SERPL-MCNC: 3.6 MMOL/L — SIGNIFICANT CHANGE UP (ref 3.5–5.3)
POTASSIUM SERPL-MCNC: 4.3 MMOL/L — SIGNIFICANT CHANGE UP (ref 3.5–5.3)
POTASSIUM SERPL-SCNC: 3.6 MMOL/L — SIGNIFICANT CHANGE UP (ref 3.5–5.3)
POTASSIUM SERPL-SCNC: 4.3 MMOL/L — SIGNIFICANT CHANGE UP (ref 3.5–5.3)
PROT SERPL-MCNC: 8.1 G/DL — SIGNIFICANT CHANGE UP (ref 6–8.3)
PROT UR-MCNC: NEGATIVE — SIGNIFICANT CHANGE UP
RBC # BLD: 3.98 M/UL — SIGNIFICANT CHANGE UP (ref 3.8–5.2)
RBC # FLD: 14.3 % — SIGNIFICANT CHANGE UP (ref 10.3–14.5)
SODIUM SERPL-SCNC: 124 MMOL/L — LOW (ref 135–145)
SODIUM SERPL-SCNC: 126 MMOL/L — LOW (ref 135–145)
SP GR SPEC: 1.01 — SIGNIFICANT CHANGE UP (ref 1.01–1.02)
UROBILINOGEN FLD QL: NEGATIVE — SIGNIFICANT CHANGE UP
WBC # BLD: 4.9 K/UL — SIGNIFICANT CHANGE UP (ref 3.8–10.5)
WBC # FLD AUTO: 4.9 K/UL — SIGNIFICANT CHANGE UP (ref 3.8–10.5)

## 2017-07-29 PROCEDURE — 99285 EMERGENCY DEPT VISIT HI MDM: CPT | Mod: 25,GC

## 2017-07-29 PROCEDURE — 99223 1ST HOSP IP/OBS HIGH 75: CPT

## 2017-07-29 PROCEDURE — 93010 ELECTROCARDIOGRAM REPORT: CPT

## 2017-07-29 RX ORDER — DOCUSATE SODIUM 100 MG
100 CAPSULE ORAL THREE TIMES A DAY
Qty: 0 | Refills: 0 | Status: DISCONTINUED | OUTPATIENT
Start: 2017-07-29 | End: 2017-07-29

## 2017-07-29 RX ORDER — SODIUM CHLORIDE 9 MG/ML
500 INJECTION INTRAMUSCULAR; INTRAVENOUS; SUBCUTANEOUS ONCE
Qty: 0 | Refills: 0 | Status: COMPLETED | OUTPATIENT
Start: 2017-07-29 | End: 2017-07-29

## 2017-07-29 RX ORDER — ONDANSETRON 8 MG/1
1 TABLET, FILM COATED ORAL
Qty: 0 | Refills: 0 | COMMUNITY

## 2017-07-29 RX ORDER — SENNA PLUS 8.6 MG/1
2 TABLET ORAL AT BEDTIME
Qty: 0 | Refills: 0 | Status: DISCONTINUED | OUTPATIENT
Start: 2017-07-29 | End: 2017-07-29

## 2017-07-29 RX ORDER — ENOXAPARIN SODIUM 100 MG/ML
40 INJECTION SUBCUTANEOUS EVERY 24 HOURS
Qty: 0 | Refills: 0 | Status: DISCONTINUED | OUTPATIENT
Start: 2017-07-29 | End: 2017-07-31

## 2017-07-29 RX ORDER — FLUTICASONE FUROATE AND VILANTEROL TRIFENATATE 100; 25 UG/1; UG/1
1 POWDER RESPIRATORY (INHALATION) DAILY
Qty: 0 | Refills: 0 | Status: DISCONTINUED | OUTPATIENT
Start: 2017-07-29 | End: 2017-07-31

## 2017-07-29 RX ORDER — ONDANSETRON 8 MG/1
4 TABLET, FILM COATED ORAL ONCE
Qty: 0 | Refills: 0 | Status: COMPLETED | OUTPATIENT
Start: 2017-07-29 | End: 2017-07-29

## 2017-07-29 RX ORDER — ONDANSETRON 8 MG/1
8 TABLET, FILM COATED ORAL EVERY 6 HOURS
Qty: 0 | Refills: 0 | Status: DISCONTINUED | OUTPATIENT
Start: 2017-07-29 | End: 2017-07-31

## 2017-07-29 RX ORDER — POLYETHYLENE GLYCOL 3350 17 G/17G
17 POWDER, FOR SOLUTION ORAL
Qty: 0 | Refills: 0 | Status: DISCONTINUED | OUTPATIENT
Start: 2017-07-29 | End: 2017-07-29

## 2017-07-29 RX ORDER — METOCLOPRAMIDE HCL 10 MG
10 TABLET ORAL EVERY 6 HOURS
Qty: 0 | Refills: 0 | Status: DISCONTINUED | OUTPATIENT
Start: 2017-07-29 | End: 2017-07-31

## 2017-07-29 RX ORDER — CHOLECALCIFEROL (VITAMIN D3) 125 MCG
1000 CAPSULE ORAL DAILY
Qty: 0 | Refills: 0 | Status: DISCONTINUED | OUTPATIENT
Start: 2017-07-29 | End: 2017-07-31

## 2017-07-29 RX ORDER — L.ACIDOPH/B.ANIMALIS/B.LONGUM 15B CELL
1 CAPSULE ORAL
Qty: 0 | Refills: 0 | COMMUNITY

## 2017-07-29 RX ORDER — GABAPENTIN 400 MG/1
1800 CAPSULE ORAL
Qty: 0 | Refills: 0 | Status: DISCONTINUED | OUTPATIENT
Start: 2017-07-29 | End: 2017-07-31

## 2017-07-29 RX ORDER — PROCHLORPERAZINE MALEATE 5 MG
10 TABLET ORAL ONCE
Qty: 0 | Refills: 0 | Status: COMPLETED | OUTPATIENT
Start: 2017-07-29 | End: 2017-07-29

## 2017-07-29 RX ORDER — SODIUM CHLORIDE 9 MG/ML
1000 INJECTION, SOLUTION INTRAVENOUS
Qty: 0 | Refills: 0 | Status: DISCONTINUED | OUTPATIENT
Start: 2017-07-29 | End: 2017-07-30

## 2017-07-29 RX ADMIN — SODIUM CHLORIDE 500 MILLILITER(S): 9 INJECTION INTRAMUSCULAR; INTRAVENOUS; SUBCUTANEOUS at 09:42

## 2017-07-29 RX ADMIN — GABAPENTIN 1800 MILLIGRAM(S): 400 CAPSULE ORAL at 18:44

## 2017-07-29 RX ADMIN — Medication 10 MILLIGRAM(S): at 18:58

## 2017-07-29 RX ADMIN — SODIUM CHLORIDE 125 MILLILITER(S): 9 INJECTION, SOLUTION INTRAVENOUS at 12:47

## 2017-07-29 RX ADMIN — ONDANSETRON 8 MILLIGRAM(S): 8 TABLET, FILM COATED ORAL at 16:16

## 2017-07-29 RX ADMIN — SODIUM CHLORIDE 125 MILLILITER(S): 9 INJECTION, SOLUTION INTRAVENOUS at 19:04

## 2017-07-29 RX ADMIN — Medication 10 MILLIGRAM(S): at 10:59

## 2017-07-29 RX ADMIN — SODIUM CHLORIDE 500 MILLILITER(S): 9 INJECTION INTRAMUSCULAR; INTRAVENOUS; SUBCUTANEOUS at 12:47

## 2017-07-29 RX ADMIN — ONDANSETRON 4 MILLIGRAM(S): 8 TABLET, FILM COATED ORAL at 09:42

## 2017-07-29 RX ADMIN — ENOXAPARIN SODIUM 40 MILLIGRAM(S): 100 INJECTION SUBCUTANEOUS at 14:01

## 2017-07-29 NOTE — H&P ADULT - NSCORESITESY/N_GEN_A_CORE_RD
No
pt. with no reported PMH presented with c/o Lt arm and leg numbness for 2 hours. pt. denies chest pain, shortness of breath, n/v, dizziness, vision changes; no sensory deficit no weakness noted.

## 2017-07-29 NOTE — H&P ADULT - HISTORY OF PRESENT ILLNESS
73 yo woman with PMH of MS on gabapentin, COPD, lung CA s/p lobectomy/xrt 4 yrs ago a/w nausea.  Pt has had episodic nausea for about a year.  Had a negative EGD and gastric emptying study.  Had a negative brain MRI last week.  This episode started yesterday, unable to take anything PO, trying zofran but not working.  Had a "stomach-ache" but only after nausea started.  Minimal diarrhea.  No fevers, chills, SOB, chest pain.

## 2017-07-29 NOTE — H&P ADULT - ASSESSMENT
73 yo woman with PMH of lung CA, MS a/w acute on chronic nausea found to be hypernatremic to 126.  Nausea has been worked up extensively in past without etiology other than possible sequalae of lung radiation.  Hyponatremia likely hypovolemic - will hydrate.

## 2017-07-29 NOTE — ED PROVIDER NOTE - NS ED ROS FT
ROS: GENERAL: no fevers, no chills, + poor PO intake HEENT: no epistaxis, no eye pain, no ear pain, no throat pain CARDIAC: no chest pain, no shortness of breath PULM: no cough, no shortness of breath GI: + nausea, no vomiting, no diarrhea, + abdominal pain, no hematemesis, no bright red blood per rectum : no dysuria, no hematuria EXTREMITIES: no arm pain, no leg pain, no back pain SKIN: no purpura, no petechiae NEURO: + headache, no neck pain, no loss of strength/sensation HEME: no easy bruising, no easy bleeding

## 2017-07-29 NOTE — ED PROVIDER NOTE - OBJECTIVE STATEMENT
71 y/o F hx of lung cancer s/p RLL lobectomy, RT and chemotherapy (several years ago with recent Imaging of Lungs WNL), COPD, MS not on any treatment presents with two days of nausea with no PO intake since noon yesterday.     She reports that she has had several episodes of nausea intermittently of the last few years. She endorses dry heaves intermittently since yesterday noon. Denies any PO intake since yesterday morning. No vomiting. Still having bowel movements. Tries zofran with varying effectiveness. Also having mild suprapubic abdominal pain, which she believes is related to not eating. Also having generalized headache. Reports having progressive weight of about 10 lbs over past year. No obvious sick contacts, no recent travel, no leg swelling or pain. Had extensive work up in past, including CT head recently which was nonrevealing, also with past CTAP which were nonrevealing. She has also had endoscopy, NM gastric emptying study where were also reportedly within normal limits. She reports that she has been seeing a nutritionist in regards to appropriate foods to eat.

## 2017-07-29 NOTE — ED PROVIDER NOTE - SECONDARY DIAGNOSIS.
Vomiting, intractability of vomiting not specified, presence of nausea not specified, unspecified vomiting type Failure to thrive in adult

## 2017-07-29 NOTE — H&P ADULT - PROBLEM SELECTOR PLAN 4
She reports that a recent PET scan showed lymph nodes in chest - going to see someone for possible biopsy next week  MRI brain last week negative

## 2017-07-29 NOTE — ED ADULT NURSE NOTE - OBJECTIVE STATEMENT
72y female with hx of MS and lung Ca presents to the ER with nausea and headache. pt is alert and oriented x 3 and speaking coherently. Pt states she has had episodes of nausea x months and a new onset of weight loss. Pt states she is unable to tolerate po food or liquids because of the nausea. Pt states she took 8mg po zofran last night- nausea not alleviated. Pt denies cp, sob, fevers, chills, n/v/d. Pt appears comfortable in bed. no work of breathing noted. Pt provided warm blanket. family at the bedside. md byrd complete. will continue to reassess.

## 2017-07-29 NOTE — ED PROVIDER NOTE - PROGRESS NOTE DETAILS
nonfocal exam brain imaging last week - mri - no need to repeat at this time Spoke with Dr. Almonte's cardiologist partner, will relay information to Dr. Almonte tomorrow, will try antiemetics and hydration, if still unable to tolerate PO, will admit to Dr. Shanna Samuels.  - Tate Garg MD

## 2017-07-29 NOTE — ED PROVIDER NOTE - CARE PLAN
Principal Discharge DX:	Dehydration  Secondary Diagnosis:	Vomiting, intractability of vomiting not specified, presence of nausea not specified, unspecified vomiting type  Secondary Diagnosis:	Failure to thrive in adult

## 2017-07-29 NOTE — ED PROVIDER NOTE - PHYSICAL EXAMINATION
PE: CONSTITUTIONAL: Thin, in no apparent distress. ENMT: Airway patent, nasal mucosa clear, mouth with normal mucosa. HEAD: NCAT EYES: PERRL, EOMI bilaterally CARDIAC: RRR, no m/r/g, no pedal edema RESPIRATORY: CTA bilaterally, no adventitious sounds GI: Abdomen non-distended, mild suprapubic TTP MSK: Spine appears normal, range of motion is not limited, no muscle/joint tenderness NEURO: CNII-XII grossly intact, 5/5 strength, full sensation all extremities, gait intact SKIN: Skin tone normal in color, warm and dry. No evidence of rash.

## 2017-07-29 NOTE — ED PROVIDER NOTE - MEDICAL DECISION MAKING DETAILS
nithya - 72 w lung ca questonable ln mets on recent pet scan - with dec appetite and dec po intake - chronic ha x 2 weeks neg mri last week no neuro deficit - iv fluids and antiemetics, anlgesia - pepcid - ck lytes ekg, need reeval may need admission for ftt

## 2017-07-30 LAB
ANION GAP SERPL CALC-SCNC: 17 MMOL/L — SIGNIFICANT CHANGE UP (ref 5–17)
BASOPHILS # BLD AUTO: 0.03 K/UL — SIGNIFICANT CHANGE UP (ref 0–0.2)
BASOPHILS NFR BLD AUTO: 0.7 % — SIGNIFICANT CHANGE UP (ref 0–2)
BUN SERPL-MCNC: 4 MG/DL — LOW (ref 7–23)
CALCIUM SERPL-MCNC: 7.8 MG/DL — LOW (ref 8.4–10.5)
CHLORIDE SERPL-SCNC: 96 MMOL/L — SIGNIFICANT CHANGE UP (ref 96–108)
CHLORIDE UR-SCNC: 22 MMOL/L — SIGNIFICANT CHANGE UP
CO2 SERPL-SCNC: 18 MMOL/L — LOW (ref 22–31)
CREAT SERPL-MCNC: 0.6 MG/DL — SIGNIFICANT CHANGE UP (ref 0.5–1.3)
EOSINOPHIL # BLD AUTO: 0.08 K/UL — SIGNIFICANT CHANGE UP (ref 0–0.5)
EOSINOPHIL NFR BLD AUTO: 2 % — SIGNIFICANT CHANGE UP (ref 0–6)
GLUCOSE SERPL-MCNC: 132 MG/DL — HIGH (ref 70–99)
HCT VFR BLD CALC: 28.2 % — LOW (ref 34.5–45)
HGB BLD-MCNC: 9.1 G/DL — LOW (ref 11.5–15.5)
IMM GRANULOCYTES NFR BLD AUTO: 0.2 % — SIGNIFICANT CHANGE UP (ref 0–1.5)
LYMPHOCYTES # BLD AUTO: 0.59 K/UL — LOW (ref 1–3.3)
LYMPHOCYTES # BLD AUTO: 14.6 % — SIGNIFICANT CHANGE UP (ref 13–44)
MAGNESIUM SERPL-MCNC: 1.7 MG/DL — SIGNIFICANT CHANGE UP (ref 1.6–2.6)
MCHC RBC-ENTMCNC: 27.2 PG — SIGNIFICANT CHANGE UP (ref 27–34)
MCHC RBC-ENTMCNC: 32.3 GM/DL — SIGNIFICANT CHANGE UP (ref 32–36)
MCV RBC AUTO: 84.2 FL — SIGNIFICANT CHANGE UP (ref 80–100)
MONOCYTES # BLD AUTO: 0.32 K/UL — SIGNIFICANT CHANGE UP (ref 0–0.9)
MONOCYTES NFR BLD AUTO: 7.9 % — SIGNIFICANT CHANGE UP (ref 2–14)
NEUTROPHILS # BLD AUTO: 3 K/UL — SIGNIFICANT CHANGE UP (ref 1.8–7.4)
NEUTROPHILS NFR BLD AUTO: 74.6 % — SIGNIFICANT CHANGE UP (ref 43–77)
OSMOLALITY SERPL: 267 MOS/KG — LOW (ref 275–300)
OSMOLALITY UR: 117 MOS/KG — SIGNIFICANT CHANGE UP (ref 50–1200)
PLATELET # BLD AUTO: 298 K/UL — SIGNIFICANT CHANGE UP (ref 150–400)
POTASSIUM SERPL-MCNC: 3.7 MMOL/L — SIGNIFICANT CHANGE UP (ref 3.5–5.3)
POTASSIUM SERPL-SCNC: 3.7 MMOL/L — SIGNIFICANT CHANGE UP (ref 3.5–5.3)
POTASSIUM UR-SCNC: 12 MMOL/L — SIGNIFICANT CHANGE UP
RBC # BLD: 3.35 M/UL — LOW (ref 3.8–5.2)
RBC # FLD: 15.5 % — HIGH (ref 10.3–14.5)
SODIUM SERPL-SCNC: 131 MMOL/L — LOW (ref 135–145)
SODIUM UR-SCNC: <20 MMOL/L — SIGNIFICANT CHANGE UP
TSH SERPL-MCNC: 4.39 UIU/ML — HIGH (ref 0.27–4.2)
WBC # BLD: 4.03 K/UL — SIGNIFICANT CHANGE UP (ref 3.8–10.5)
WBC # FLD AUTO: 4.03 K/UL — SIGNIFICANT CHANGE UP (ref 3.8–10.5)

## 2017-07-30 RX ORDER — ACETAMINOPHEN 500 MG
650 TABLET ORAL EVERY 6 HOURS
Qty: 0 | Refills: 0 | Status: DISCONTINUED | OUTPATIENT
Start: 2017-07-30 | End: 2017-07-31

## 2017-07-30 RX ORDER — SODIUM CHLORIDE 9 MG/ML
1000 INJECTION INTRAMUSCULAR; INTRAVENOUS; SUBCUTANEOUS
Qty: 0 | Refills: 0 | Status: DISCONTINUED | OUTPATIENT
Start: 2017-07-30 | End: 2017-07-31

## 2017-07-30 RX ADMIN — Medication 650 MILLIGRAM(S): at 16:51

## 2017-07-30 RX ADMIN — Medication 1000 UNIT(S): at 12:53

## 2017-07-30 RX ADMIN — ENOXAPARIN SODIUM 40 MILLIGRAM(S): 100 INJECTION SUBCUTANEOUS at 15:37

## 2017-07-30 RX ADMIN — Medication 1 TABLET(S): at 12:52

## 2017-07-30 RX ADMIN — GABAPENTIN 1800 MILLIGRAM(S): 400 CAPSULE ORAL at 06:14

## 2017-07-30 RX ADMIN — GABAPENTIN 1800 MILLIGRAM(S): 400 CAPSULE ORAL at 17:10

## 2017-07-30 RX ADMIN — Medication 650 MILLIGRAM(S): at 15:51

## 2017-07-30 RX ADMIN — FLUTICASONE FUROATE AND VILANTEROL TRIFENATATE 1 PUFF(S): 100; 25 POWDER RESPIRATORY (INHALATION) at 13:17

## 2017-07-30 NOTE — DIETITIAN INITIAL EVALUATION ADULT. - ENERGY NEEDS
Height: 4' 10"      Weight: 79.1 lbs   BMI: 16.5 kg/m2    IBW: 98 lbs  (+/- 10%)    % IBW: 81 %          Edema: none noted       Skin:  no impairment     BM:  last BM reported 7/28/17     Other pertinent information: Patient presented with nausea.  She was admitted with nausea - acute on chronic, hyponatremia, COPD, malignant neoplasm of lung.  Noted PMH includes multiple sclerosis, COPD, lung cancer - s/p lobectomy/xrt 4 years ago.  Episodic nausea for about a year - negative EGD, negative gastric emptying study and recent negative brain MRI.  Noted possible etiology of nausea sequale of lung radiation. Height: 4' 10"      Weight: 79.1 lbs   BMI: 16.5 kg/m2    IBW: 98 lbs  (+/- 10%)    % IBW: 81 %          Edema: none noted       Skin:  no impairment     BM:  last BM reported 7/28/17     Other pertinent information: Patient presented with nausea.  She was admitted with nausea - acute on chronic, hyponatremia, COPD, malignant neoplasm of lung.  Noted PMH includes multiple sclerosis, COPD, lung cancer - s/p lobectomy/xrt 4 years ago.  Episodic nausea for about a year - negative EGD, negative gastric emptying study and recent negative brain MRI.  Noted possible etiology of nausea sequale of lung radiation.  Weight noted in RD assessment of 4/16/17 - 79.5 lbs

## 2017-07-30 NOTE — DIETITIAN INITIAL EVALUATION ADULT. - NS AS NUTRI INTERV MEALS SNACK
1)  Advance diet to soft, lactose restricted diet as tolerated   2)  Obtain and honor meal preferences  3) Recommend Glucerna 1 x daily

## 2017-07-30 NOTE — CONSULT NOTE ADULT - SUBJECTIVE AND OBJECTIVE BOX
Coverage for Dr. Almonte        HPI:  73 yo woman with PMH of MS on gabapentin, COPD, lung CA s/p lobectomy/xrt 4 yrs ago a/w nausea.  Pt has had episodic nausea for about a year.  Had a negative EGD and gastric emptying study.  Had a negative brain MRI last week.  This episode started yesterday, unable to take anything PO, trying zofran but not working.  Had a "stomach-ache" but only after nausea started.  Minimal diarrhea.  No fevers, chills, SOB, chest pain. Pt treated with antiemetics and IVF with resolution of her prior symptoms. She is tolerating clears. She denies any chest pain, dyspnea, orthopnea,palpitations or syncope.      PAST MEDICAL & SURGICAL HISTORY:  COPD (chronic obstructive pulmonary disease)  MS (multiple sclerosis)  Lung cancer  History of radiation therapy: to chest for lung cancer  Age related cataract  H/O benign breast biopsy  S/P lobectomy of lung: right lower lobectomy   	    MEDICATIONS:  MEDICATIONS  (STANDING):  dextrose 5% + sodium chloride 0.9%. 1000 milliLiter(s) (125 mL/Hr) IV Continuous <Continuous>  enoxaparin Injectable 40 milliGRAM(s) SubCutaneous every 24 hours  gabapentin 1800 milliGRAM(s) Oral two times a day  calcium carbonate  625 mG + Vitamin D (OsCal 250 + D) 1 Tablet(s) Oral daily  cholecalciferol 1000 Unit(s) Oral daily  fluticasone furoate/vilanterol 100-25 MICROgram(s) Inhaler 1 Puff(s) Inhalation daily      FAMILY HISTORY:  No pertinent family history in first degree relatives      SOCIAL HISTORY:    [ ] Non-smoker  [ ] Smoker  [ ] Alcohol    Allergies    No Known Allergies    Intolerances    codeine (Other)  shellfish (Vomiting; Nausea; Stomach Upset)  	    REVIEW OF SYSTEMS:  CONSTITUTIONAL: + nausea  EYES: No eye pain, visual disturbances, or discharge  ENMT:  No difficulty hearing, tinnitus, vertigo; No sinus or throat pain  NECK: No pain or stiffness  RESPIRATORY: No cough, wheezing, chills or hemoptysis; No Shortness of Breath  CARDIOVASCULAR: No chest pain, palpitations, passing out, dizziness, or leg swelling  GASTROINTESTINAL: No abdominal or epigastric pain. No nausea, vomiting, or hematemesis; No diarrhea or constipation. No melena or hematochezia.  GENITOURINARY: No dysuria, frequency, hematuria, or incontinence  NEUROLOGICAL: No headaches, memory loss, loss of strength, numbness, or tremors  SKIN: No itching, burning, rashes, or lesions   	    [ ] All others negative	  [ ] Unable to obtain    PHYSICAL EXAM:  T(C): 36.3 (07-30-17 @ 00:12), Max: 36.6 (07-29-17 @ 09:17)  HR: 72 (07-30-17 @ 00:12) (72 - 95)  BP: 103/69 (07-30-17 @ 00:12) (103/69 - 152/99)  RR: 18 (07-30-17 @ 00:12) (18 - 20)  SpO2: 96% (07-30-17 @ 00:12) (94% - 98%)  Wt(kg): --  I&O's Summary    29 Jul 2017 07:01  -  30 Jul 2017 07:00  --------------------------------------------------------  IN: 2220 mL / OUT: 500 mL / NET: 1720 mL        Appearance: Normal	  Psychiatry: A & O x 3, Mood & affect appropriate  HEENT:   Normal oral mucosa, PERRL, EOMI	  Lymphatic: No lymphadenopathy  Cardiovascular: Normal S1 S2,RRR, No JVD, No murmurs  Respiratory: Lungs clear to auscultation	  Gastrointestinal:  Soft, Non-tender, + BS	  Skin: No rashes, No ecchymoses, No cyanosis	  Neurologic: Non-focal  Extremities: Normal range of motion, No clubbing, cyanosis or edema  Vascular: Peripheral pulses palpable 2+ bilaterally      	  RADIOLOGY:  OTHER: 	  	  LABS:	 	    CARDIAC MARKERS:                                  11.6   4.9   )-----------( 322      ( 29 Jul 2017 09:39 )             35.1     07-29    124<L>  |  88<L>  |  5<L>  ----------------------------<  238<H>  3.6   |  20<L>  |  0.54    Ca    8.3<L>      29 Jul 2017 20:15  Phos  2.4     07-29  Mg     2.0     07-29    TPro  8.1  /  Alb  3.9  /  TBili  0.6  /  DBili  x   /  AST  15  /  ALT  7<L>  /  AlkPhos  73  07-29      proBNP:   Lipid Profile:   HgA1c:   TSH:     ASSESSMENT/PLAN:

## 2017-07-30 NOTE — DIETITIAN INITIAL EVALUATION ADULT. - NS AS NUTRI INTERV COLLABORAT
Collaboration with other providers/malnutrition discussed with NP and alert placed in medical record

## 2017-07-30 NOTE — DIETITIAN INITIAL EVALUATION ADULT. - ADHERENCE
Patient reports a lactose intolerance and drinks lactaid milk.  Reports that she does not tolerate raw vegetables and some raw fruits in large portions.

## 2017-07-30 NOTE — PROGRESS NOTE ADULT - ASSESSMENT
71 yo woman with PMH of lung CA, MS a/w acute on chronic nausea found to be hypernatremic to 126.  Nausea has been worked up extensively in past without etiology other than possible sequalae of lung radiation.  Hyponatremia likely hypovolemic - will hydrate.

## 2017-07-30 NOTE — DIETITIAN INITIAL EVALUATION ADULT. - SOURCE
patient/comprehensive chart/medical record review comprehensive chart/medical record review, including previous RD assessment from 4/16/17/patient

## 2017-07-30 NOTE — DIETITIAN INITIAL EVALUATION ADULT. - ORAL INTAKE PTA
Patient reports her ability to consume meals varies with bouts of nausea.  Stated she may be able to eat all of her meals for several days at a time.  Reports nausea could last a full day at times.   She likes corn toasties, oatmeal or cold cereal and coffee for breakfast; ham sandwich for lunch and chicken/starch/cooked vegetable or pasta and vegetables or Chinese food for dinner.  Patient reports that she will whatever foods she desires just to consume calories.  She may have Lisa Doone cookies or chocolate cake and lactaid milk at times in place of a meal.

## 2017-07-30 NOTE — DIETITIAN INITIAL EVALUATION ADULT. - PERTINENT LABORATORY DATA
7/30/17:  Hgb/Hct - 9.1/28.2, Na - 131, Ca - 7.8, glucose - 132 (<= 238 on 7/29/17);   7/29/17: phos - 2.4

## 2017-07-30 NOTE — CHART NOTE - NSCHARTNOTEFT_GEN_A_CORE
Upon Nutritional Assessment by the Registered Dietitian your patient was determined to meet criteria / has evidence of the following diagnosis/diagnoses:          [ ]  Mild Protein Calorie Malnutrition        [ ]  Moderate Protein Calorie Malnutrition        [X] Severe Protein Calorie Malnutrition        [ ] Unspecified Protein Calorie Malnutrition        [ ] Underweight / BMI <19        [ ] Morbid Obesity / BMI > 40      Findings as based on:  [X] Comprehensive nutrition assessment   [ ] Nutrition Focused Physical Exam  [ ] Other:       Nutrition Plan/Recommendations:  Glucerna 1 x daily        PROVIDER Section:     By signing this assessment you are acknowledging and agree with the diagnosis/diagnoses assigned by the Registered Dietitian    Comments:

## 2017-07-30 NOTE — DIETITIAN INITIAL EVALUATION ADULT. - OTHER INFO
Received nutrition consult for Nausea/Vomiting > 3 days PTA and unintentional weight loss.  denies any difficulty chewing or swallowing and has no nausea/emesis or GI discomfort at the present time.  Confirmed NKFA. Received nutrition consult for Nausea/Vomiting > 3 days PTA and unintentional weight loss.  Patient stated her nausea and headache are "gone" this morning and that she feels hungry.  Patient reports tolerating clear liquids last night and this morning with good intakes.  Stated that she consumed several cups of juice, jello, broth and 50% of promote.  She denies any difficulty chewing or swallowing and has no GI discomfort at the present time.  Weight history obtained and patient had weighed 105 lbs ~ 10 years ago.  Gradual weight loss of ~ 10 lbs and her weight was 95 lbs 6 years ago.  Weight gradually decreased to 80 lbs over the past 5 years.  Patient was drinking glucerna PTA, however, not on a regular basis.  Calcium/Vitamin D and Vitamin D3 taken PTA.  Confirmed NKFA.

## 2017-07-30 NOTE — CONSULT NOTE ADULT - ASSESSMENT
Pt is a 72 year old woman with past medical history as above presenting with nausea, denhydration and hyponatremia    -Pt clinically improving after IVF, antiemetics  -CV status appears stable, no evidence of fluid overload  -Monitor sodium  -consider renal eval  -DVT ppx

## 2017-07-31 ENCOUNTER — TRANSCRIPTION ENCOUNTER (OUTPATIENT)
Age: 72
End: 2017-07-31

## 2017-07-31 VITALS
TEMPERATURE: 98 F | DIASTOLIC BLOOD PRESSURE: 86 MMHG | HEART RATE: 89 BPM | SYSTOLIC BLOOD PRESSURE: 134 MMHG | RESPIRATION RATE: 18 BRPM | OXYGEN SATURATION: 98 %

## 2017-07-31 LAB
ANION GAP SERPL CALC-SCNC: 12 MMOL/L — SIGNIFICANT CHANGE UP (ref 5–17)
BUN SERPL-MCNC: 2 MG/DL — LOW (ref 7–23)
CALCIUM SERPL-MCNC: 8.8 MG/DL — SIGNIFICANT CHANGE UP (ref 8.4–10.5)
CHLORIDE SERPL-SCNC: 98 MMOL/L — SIGNIFICANT CHANGE UP (ref 96–108)
CO2 SERPL-SCNC: 23 MMOL/L — SIGNIFICANT CHANGE UP (ref 22–31)
CREAT SERPL-MCNC: 0.53 MG/DL — SIGNIFICANT CHANGE UP (ref 0.5–1.3)
GLUCOSE SERPL-MCNC: 88 MG/DL — SIGNIFICANT CHANGE UP (ref 70–99)
HCT VFR BLD CALC: 30.7 % — LOW (ref 34.5–45)
HGB BLD-MCNC: 9.7 G/DL — LOW (ref 11.5–15.5)
MCHC RBC-ENTMCNC: 26.6 PG — LOW (ref 27–34)
MCHC RBC-ENTMCNC: 31.6 GM/DL — LOW (ref 32–36)
MCV RBC AUTO: 84.3 FL — SIGNIFICANT CHANGE UP (ref 80–100)
PLATELET # BLD AUTO: 346 K/UL — SIGNIFICANT CHANGE UP (ref 150–400)
POTASSIUM SERPL-MCNC: 4 MMOL/L — SIGNIFICANT CHANGE UP (ref 3.5–5.3)
POTASSIUM SERPL-SCNC: 4 MMOL/L — SIGNIFICANT CHANGE UP (ref 3.5–5.3)
RBC # BLD: 3.64 M/UL — LOW (ref 3.8–5.2)
RBC # FLD: 15.6 % — HIGH (ref 10.3–14.5)
SODIUM SERPL-SCNC: 133 MMOL/L — LOW (ref 135–145)
WBC # BLD: 3.58 K/UL — LOW (ref 3.8–10.5)
WBC # FLD AUTO: 3.58 K/UL — LOW (ref 3.8–10.5)

## 2017-07-31 PROCEDURE — 82947 ASSAY GLUCOSE BLOOD QUANT: CPT

## 2017-07-31 PROCEDURE — 84443 ASSAY THYROID STIM HORMONE: CPT

## 2017-07-31 PROCEDURE — 83735 ASSAY OF MAGNESIUM: CPT

## 2017-07-31 PROCEDURE — 84300 ASSAY OF URINE SODIUM: CPT

## 2017-07-31 PROCEDURE — 85027 COMPLETE CBC AUTOMATED: CPT

## 2017-07-31 PROCEDURE — 83930 ASSAY OF BLOOD OSMOLALITY: CPT

## 2017-07-31 PROCEDURE — 85014 HEMATOCRIT: CPT

## 2017-07-31 PROCEDURE — 94640 AIRWAY INHALATION TREATMENT: CPT

## 2017-07-31 PROCEDURE — 83605 ASSAY OF LACTIC ACID: CPT

## 2017-07-31 PROCEDURE — 80053 COMPREHEN METABOLIC PANEL: CPT

## 2017-07-31 PROCEDURE — 84132 ASSAY OF SERUM POTASSIUM: CPT

## 2017-07-31 PROCEDURE — 84133 ASSAY OF URINE POTASSIUM: CPT

## 2017-07-31 PROCEDURE — 84100 ASSAY OF PHOSPHORUS: CPT

## 2017-07-31 PROCEDURE — 82803 BLOOD GASES ANY COMBINATION: CPT

## 2017-07-31 PROCEDURE — 93005 ELECTROCARDIOGRAM TRACING: CPT

## 2017-07-31 PROCEDURE — 82436 ASSAY OF URINE CHLORIDE: CPT

## 2017-07-31 PROCEDURE — 96374 THER/PROPH/DIAG INJ IV PUSH: CPT

## 2017-07-31 PROCEDURE — 99285 EMERGENCY DEPT VISIT HI MDM: CPT | Mod: 25

## 2017-07-31 PROCEDURE — 96375 TX/PRO/DX INJ NEW DRUG ADDON: CPT

## 2017-07-31 PROCEDURE — 82330 ASSAY OF CALCIUM: CPT

## 2017-07-31 PROCEDURE — 84295 ASSAY OF SERUM SODIUM: CPT

## 2017-07-31 PROCEDURE — 83935 ASSAY OF URINE OSMOLALITY: CPT

## 2017-07-31 PROCEDURE — 81003 URINALYSIS AUTO W/O SCOPE: CPT

## 2017-07-31 PROCEDURE — 82435 ASSAY OF BLOOD CHLORIDE: CPT

## 2017-07-31 PROCEDURE — 80048 BASIC METABOLIC PNL TOTAL CA: CPT

## 2017-07-31 RX ORDER — LEVOTHYROXINE SODIUM 125 MCG
25 TABLET ORAL DAILY
Qty: 0 | Refills: 0 | Status: DISCONTINUED | OUTPATIENT
Start: 2017-07-31 | End: 2017-07-31

## 2017-07-31 RX ORDER — LEVOTHYROXINE SODIUM 125 MCG
1 TABLET ORAL
Qty: 30 | Refills: 0 | OUTPATIENT
Start: 2017-07-31 | End: 2017-08-30

## 2017-07-31 RX ORDER — LEVOTHYROXINE SODIUM 125 MCG
1 TABLET ORAL
Qty: 0 | Refills: 0 | COMMUNITY
Start: 2017-07-31

## 2017-07-31 RX ADMIN — Medication 650 MILLIGRAM(S): at 03:42

## 2017-07-31 RX ADMIN — Medication 1000 UNIT(S): at 12:49

## 2017-07-31 RX ADMIN — Medication 650 MILLIGRAM(S): at 10:00

## 2017-07-31 RX ADMIN — GABAPENTIN 1800 MILLIGRAM(S): 400 CAPSULE ORAL at 05:31

## 2017-07-31 RX ADMIN — Medication 650 MILLIGRAM(S): at 17:10

## 2017-07-31 RX ADMIN — GABAPENTIN 1800 MILLIGRAM(S): 400 CAPSULE ORAL at 17:41

## 2017-07-31 RX ADMIN — Medication 650 MILLIGRAM(S): at 10:45

## 2017-07-31 RX ADMIN — ENOXAPARIN SODIUM 40 MILLIGRAM(S): 100 INJECTION SUBCUTANEOUS at 13:14

## 2017-07-31 RX ADMIN — FLUTICASONE FUROATE AND VILANTEROL TRIFENATATE 1 PUFF(S): 100; 25 POWDER RESPIRATORY (INHALATION) at 12:48

## 2017-07-31 RX ADMIN — Medication 650 MILLIGRAM(S): at 05:22

## 2017-07-31 RX ADMIN — Medication 650 MILLIGRAM(S): at 16:25

## 2017-07-31 RX ADMIN — Medication 1 TABLET(S): at 12:49

## 2017-07-31 RX ADMIN — SODIUM CHLORIDE 75 MILLILITER(S): 9 INJECTION INTRAMUSCULAR; INTRAVENOUS; SUBCUTANEOUS at 10:00

## 2017-07-31 NOTE — DISCHARGE NOTE ADULT - PLAN OF CARE
Free from reoccurrence of symptoms Resolved, tolerated advanced diet  Seek medical help for decreased food or fluid intake.  Keep well hydrated Resolved  Seek medical help for decreased food or fluid intake.  Keep well hydrated Sodium today is 133   Improved with improved oral intake  Seek medical help for decreased food or fluid intake>  Keep well hydrated Follow up with oncologist at [facility] for consultation on 8/3 and on lung biopsy on 8/7 as previously scheduled Call your Health Care provider upon arrival home to make a follow up appointment within one week.  Take all inhalers as prescribed by your Health Care Provider.  If your cough increases infrequency and severity and/or you have shortness of breath or increased shortness of breath call your Health Care Provider.  If you develop fever, chills, night sweats, malaise, and/or change in mental status call your Health care Provider.  Nutrition is very important.  Eat small frequent meals.  Increase your activity as tolerated.  Do not stay in bed all day Continue Neurontin as recommended  Follow up with PMD - Dr. Almonte in 1 week - call for appointment you do not make enough thyroid hormone  signs & symptoms of low levels of thyroid hormone - tired, getting cold easily, coarse or thin hair, constipation, shortness of breath, swelling, irregular periods  your doctor will do thyroid hormone blood tests at least once a year to monitor if medication dose is adequate  Newly diagnosed  take your thyroid medicine as directed by your doctor & on empty stomach

## 2017-07-31 NOTE — CHART NOTE - NSCHARTNOTEFT_GEN_A_CORE
Request from Dr. Samuels to facilitate patient discharge. Medication reconciliation reviewed, revised, and resolved with Dr. Samuels who had medically cleared patient for discharge with follow-up as advised. Please refer to discharge note for detailed hospital course. Patient is currently stable for discharge to home at this time.    Contact # [phone]

## 2017-07-31 NOTE — DISCHARGE NOTE ADULT - HOSPITAL COURSE
MD 73 yo woman with PMH of lung CA, MS a/w acute on chronic nausea found to be hypernatremic to 126.  Nausea has been worked up extensively in past without etiology other than possible sequalae of lung radiation.  Hyponatremia likely hypovolemic - will hydrate.     Problem/Plan - 1:  ·  Problem: Nausea.  Plan: IV zofran and reglan  IV fluids/ decrease rate to 75  Clears/advance to full liquids.      Problem/Plan - 2:  ·  Problem: Hyponatremia.  Plan: improving  follow BMP.      Problem/Plan - 3:  ·  Problem: Chronic obstructive pulmonary disease, unspecified COPD type.  Plan: Pt would like to take her own breo-ellipta (ordered via pharmacy).      Problem/Plan - 4:  ·  Problem: Malignant neoplasm of lung, unspecified laterality, unspecified part of lung.  Plan: She reports that a recent PET scan showed lymph nodes in chest - going to see someone for possible biopsy next week  MRI brain last week negative.      Problem/Plan - 5:  ·  Problem: MS (multiple sclerosis).  Plan: C/w home gabapentin 1800mg bid.

## 2017-07-31 NOTE — DISCHARGE NOTE ADULT - SECONDARY DIAGNOSIS.
Dehydration Hyponatremia Malignant neoplasm of lung, unspecified laterality, unspecified part of lung MS (multiple sclerosis) Chronic obstructive pulmonary disease, unspecified COPD type Hypothyroid

## 2017-07-31 NOTE — DISCHARGE NOTE ADULT - PATIENT PORTAL LINK FT
“You can access the FollowHealth Patient Portal, offered by [facility], by registering with the following website: [email or web address]”

## 2017-07-31 NOTE — PROGRESS NOTE ADULT - SUBJECTIVE AND OBJECTIVE BOX
MARTHA VELA    Patient is a 72y old  Female who presents with a chief complaint of nausea,dehydration,hyponatremia,Lung Ca,MS.      Allergies    No Known Allergies    Intolerances    codeine (Other)  shellfish (Vomiting; Nausea; Stomach Upset)    MEDICATIONS  (STANDING):  enoxaparin Injectable 40 milliGRAM(s) SubCutaneous every 24 hours  gabapentin 1800 milliGRAM(s) Oral two times a day  calcium carbonate  625 mG + Vitamin D (OsCal 250 + D) 1 Tablet(s) Oral daily  cholecalciferol 1000 Unit(s) Oral daily  fluticasone furoate/vilanterol 100-25 MICROgram(s) Inhaler 1 Puff(s) Inhalation daily  sodium chloride 0.9%. 1000 milliLiter(s) (75 mL/Hr) IV Continuous <Continuous>    MEDICATIONS  (PRN):  ondansetron Injectable 8 milliGRAM(s) IV Push every 6 hours PRN Nausea  metoclopramide Injectable 10 milliGRAM(s) IV Push every 6 hours PRN Nausea and/or Vomiting  acetaminophen   Tablet. 650 milliGRAM(s) Oral every 6 hours PRN Mild Pain (1 - 3)      ROS:  Positive:Nausea    General: Denies weight loss, fevers, rash, decreased hearing  Cardiac: Denies chest pain, SOB, SZYMANSKI, orthopnea, PND, claudication, edema, snoring, daytime somnolence, palpitations, syncope  Resp: Denies SOB, SZYMANSKI, cough, sputum, wheezing, hemoptysis  GI: Denies change in bowel habits, diarrhea, weight loss, melena, tarry stools,    vomiting, jaundice, abdominal pain, dysphagia  : Denies dysuria, nocturia, hematuria  Neuro: Denies tinnitus, headache, visual changes, weakness, dizziness or vertigo  Musculoskeletal: Denies neck pain back pain joint pain.  Skin: Denies rash, itching, dryness.  Endocrine: Denies polydipsia, polyuria  Psychiatric: Denies depression, anxiety      PHYSICAL EXAM:  Vital Signs Last 24 Hrs  T(C): 36.5 (2017 23:18), Max: 36.8 (2017 07:45)  T(F): 97.7 (2017 23:18), Max: 98.2 (2017 07:45)  HR: 75 (2017 23:18) (73 - 89)  BP: 121/79 (2017 23:18) (121/79 - 132/84)  BP(mean): --  RR: 16 (2017 23:18) (16 - 18)  SpO2: 98% (2017 23:18) (96% - 98%)  Daily     Daily Weight in k.4 (2017 10:12)  I&O's Summary    2017 07:  -  2017 07:00  --------------------------------------------------------  IN: 2220 mL / OUT: 500 mL / NET: 1720 mL    2017 07:01  -  2017 05:24  --------------------------------------------------------  IN: 1480 mL / OUT: 2450 mL / NET: -970 mL        General Appearance: 	 Alert, cooperative, no distress  HEENT: normocephalic, atraumatic, PERRLA, EOMI, conjunctiva normal, sclera anicteric,   Neck: no JVD,  carotid 2+  bilaterally without bruits, thyroid normal to inspection and palpation, no adenopathy, trachea midline  Lungs:  clear to auscultation and percussion bilaterally  Cor:  pmi 5th ICS MCL, regular rate and rhythm, S1 normal intensity, S2 normal intensity, no gallops, murmurs or rubs  Abdomen:	 soft, non-tender; bowel sounds normal; no masses,  no organomegaly  Extremities: without cyanosis, clubbing or edema  Vasc: 2-+ PT and DP pulses; no varicosities  Neurologic: A&O x 3 (time, place, person). Symmetric strength; limited exam  Musculoskeletal: no kyphosis, scoliosis; normal gait, normal tone  Skin: no rashes; limited exam      Labs:  CBC Full  -  ( 2017 08:53 )  WBC Count : 4.03 K/uL  Hemoglobin : 9.1 g/dL  Hematocrit : 28.2 %  Platelet Count - Automated : 298 K/uL  Mean Cell Volume : 84.2 fl  Mean Cell Hemoglobin : 27.2 pg  Mean Cell Hemoglobin Concentration : 32.3 gm/dL  Auto Neutrophil # : 3.00 K/uL  Auto Lymphocyte # : 0.59 K/uL  Auto Monocyte # : 0.32 K/uL  Auto Eosinophil # : 0.08 K/uL  Auto Basophil # : 0.03 K/uL  Auto Neutrophil % : 74.6 %  Auto Lymphocyte % : 14.6 %  Auto Monocyte % : 7.9 %  Auto Eosinophil % : 2.0 %  Auto Basophil % : 0.7 %            Urinalysis Basic - ( 2017 11:02 )    Color: x / Appearance: Clear / S.010 / pH: x  Gluc: x / Ketone: Moderate  / Bili: Negative / Urobili: Negative   Blood: x / Protein: Negative / Nitrite: Negative   Leuk Esterase: Negative / RBC: x / WBC x   Sq Epi: x / Non Sq Epi: x / Bacteria: x        Impression/Plan:Patient with H/O lung CA,MS,Gerd admitted with dehydration,nausea and hyponatremia.NA improved.Clinically improved.F/U anemia,Fe studies,B12 and stool guaiac's.F/U GI.OOB ambulation.        Chandra Almonte MD, FACC  [facility]

## 2017-07-31 NOTE — DISCHARGE NOTE ADULT - CARE PROVIDER_API CALL
Chandra Almonte), Cardiovascular Disease; Internal Medicine  [address] [place] [address]  Phone: [phone]  Fax: [phone]

## 2017-07-31 NOTE — DISCHARGE NOTE ADULT - MEDICATION SUMMARY - MEDICATIONS TO TAKE
I will START or STAY ON the medications listed below when I get home from the hospital:    gabapentin 600 mg oral tablet  -- 3 tab(s) by mouth 2 times a day  -- Indication: For Multiple sclerosis    ondansetron 8 mg oral tablet, disintegrating  -- 1 tab(s) by mouth 3 times a day, As Needed  -- Indication: For Nausea    Breo Ellipta 100 mcg-25 mcg/inh inhalation powder  -- 1 puff(s) inhaled once a day  -- Indication: For Chronic obstructive pulmonary disease, unspecified COPD type    Probiotic Formula oral capsule  -- 1 cap(s) by mouth once a day  -- Indication: For protection    levothyroxine 25 mcg (0.025 mg) oral tablet  -- 1 tab(s) by mouth once a day  -- Indication: For Hypothyroidism    Os-Adán 500 + D  -- 1 tab(s) by mouth once a day  -- Indication: For Supplement    Vitamin D3 1000 intl units oral tablet  -- 1 tab(s) by mouth once a day  -- Indication: For Supplement I will START or STAY ON the medications listed below when I get home from the hospital:    gabapentin 600 mg oral tablet  -- 3 tab(s) by mouth 2 times a day  -- Indication: For Multiple sclerosis    ondansetron 8 mg oral tablet, disintegrating  -- 1 tab(s) by mouth 3 times a day, As Needed  -- Indication: For Nausea    Breo Ellipta 100 mcg-25 mcg/inh inhalation powder  -- 1 puff(s) inhaled once a day  -- Indication: For Chronic obstructive pulmonary disease, unspecified COPD type    Probiotic Formula oral capsule  -- 1 cap(s) by mouth once a day  -- Indication: For protection    levothyroxine 25 mcg (0.025 mg) oral tablet  -- 1 tab(s) by mouth once a day  -- Indication: For Hypothyroid    Os-Adán 500 + D  -- 1 tab(s) by mouth once a day  -- Indication: For Supplement    Vitamin D3 1000 intl units oral tablet  -- 1 tab(s) by mouth once a day  -- Indication: For Supplement

## 2017-07-31 NOTE — DISCHARGE NOTE ADULT - ADDITIONAL INSTRUCTIONS
Follow up with PMD - Dr. Almonte in 1 week - call for appointment  Follow up with oncologist at [facility] for consultation on 8/3 and on lung biopsy on 8/7 as prevoiusly scheduled

## 2017-07-31 NOTE — DISCHARGE NOTE ADULT - CARE PLAN
Principal Discharge DX:	Nausea  Goal:	Free from reoccurrence of symptoms  Instructions for follow-up, activity and diet:	Resolved, tolerated advanced diet  Seek medical help for decreased food or fluid intake.  Keep well hydrated  Secondary Diagnosis:	Dehydration  Instructions for follow-up, activity and diet:	Resolved  Seek medical help for decreased food or fluid intake.  Keep well hydrated  Secondary Diagnosis:	Hyponatremia  Instructions for follow-up, activity and diet:	Sodium today is 133   Improved with improved oral intake  Seek medical help for decreased food or fluid intake>  Keep well hydrated  Secondary Diagnosis:	Malignant neoplasm of lung, unspecified laterality, unspecified part of lung  Instructions for follow-up, activity and diet:	Follow up with oncologist at [facility] for consultation on 8/3 and on lung biopsy on 8/7 as previously scheduled  Secondary Diagnosis:	MS (multiple sclerosis)  Instructions for follow-up, activity and diet:	Continue Neurontin as recommended  Follow up with PMD - Dr. Almonte in 1 week - call for appointment  Secondary Diagnosis:	Chronic obstructive pulmonary disease, unspecified COPD type  Instructions for follow-up, activity and diet:	Call your Health Care provider upon arrival home to make a follow up appointment within one week.  Take all inhalers as prescribed by your Health Care Provider.  If your cough increases infrequency and severity and/or you have shortness of breath or increased shortness of breath call your Health Care Provider.  If you develop fever, chills, night sweats, malaise, and/or change in mental status call your Health care Provider.  Nutrition is very important.  Eat small frequent meals.  Increase your activity as tolerated.  Do not stay in bed all day Principal Discharge DX:	Nausea  Goal:	Free from reoccurrence of symptoms  Instructions for follow-up, activity and diet:	Resolved, tolerated advanced diet  Seek medical help for decreased food or fluid intake.  Keep well hydrated  Secondary Diagnosis:	Dehydration  Instructions for follow-up, activity and diet:	Resolved  Seek medical help for decreased food or fluid intake.  Keep well hydrated  Secondary Diagnosis:	Hyponatremia  Instructions for follow-up, activity and diet:	Sodium today is 133   Improved with improved oral intake  Seek medical help for decreased food or fluid intake>  Keep well hydrated  Secondary Diagnosis:	Malignant neoplasm of lung, unspecified laterality, unspecified part of lung  Instructions for follow-up, activity and diet:	Follow up with oncologist at [facility] for consultation on 8/3 and on lung biopsy on 8/7 as previously scheduled  Secondary Diagnosis:	MS (multiple sclerosis)  Instructions for follow-up, activity and diet:	Continue Neurontin as recommended  Follow up with PMD - Dr. Almonte in 1 week - call for appointment  Secondary Diagnosis:	Chronic obstructive pulmonary disease, unspecified COPD type  Instructions for follow-up, activity and diet:	Call your Health Care provider upon arrival home to make a follow up appointment within one week.  Take all inhalers as prescribed by your Health Care Provider.  If your cough increases infrequency and severity and/or you have shortness of breath or increased shortness of breath call your Health Care Provider.  If you develop fever, chills, night sweats, malaise, and/or change in mental status call your Health care Provider.  Nutrition is very important.  Eat small frequent meals.  Increase your activity as tolerated.  Do not stay in bed all day  Secondary Diagnosis:	Hypothyroid  Instructions for follow-up, activity and diet:	you do not make enough thyroid hormone  signs & symptoms of low levels of thyroid hormone - tired, getting cold easily, coarse or thin hair, constipation, shortness of breath, swelling, irregular periods  your doctor will do thyroid hormone blood tests at least once a year to monitor if medication dose is adequate  Newly diagnosed  take your thyroid medicine as directed by your doctor & on empty stomach Principal Discharge DX:	Nausea  Goal:	Free from reoccurrence of symptoms  Instructions for follow-up, activity and diet:	Resolved, tolerated advanced diet  Seek medical help for decreased food or fluid intake.  Keep well hydrated  Secondary Diagnosis:	Dehydration  Instructions for follow-up, activity and diet:	Resolved  Seek medical help for decreased food or fluid intake.  Keep well hydrated  Secondary Diagnosis:	Hyponatremia  Instructions for follow-up, activity and diet:	Sodium today is 133   Improved with improved oral intake  Seek medical help for decreased food or fluid intake>  Keep well hydrated  Secondary Diagnosis:	Malignant neoplasm of lung, unspecified laterality, unspecified part of lung  Instructions for follow-up, activity and diet:	Follow up with oncologist at [facility] for consultation on 8/3 and on lung biopsy on 8/7 as previously scheduled  Secondary Diagnosis:	MS (multiple sclerosis)  Instructions for follow-up, activity and diet:	Continue Neurontin as recommended  Follow up with PMD - Dr. Almonte in 1 week - call for appointment  Secondary Diagnosis:	Chronic obstructive pulmonary disease, unspecified COPD type  Instructions for follow-up, activity and diet:	Call your Health Care provider upon arrival home to make a follow up appointment within one week.  Take all inhalers as prescribed by your Health Care Provider.  If your cough increases infrequency and severity and/or you have shortness of breath or increased shortness of breath call your Health Care Provider.  If you develop fever, chills, night sweats, malaise, and/or change in mental status call your Health care Provider.  Nutrition is very important.  Eat small frequent meals.  Increase your activity as tolerated.  Do not stay in bed all day  Secondary Diagnosis:	Hypothyroid  Instructions for follow-up, activity and diet:	you do not make enough thyroid hormone  signs & symptoms of low levels of thyroid hormone - tired, getting cold easily, coarse or thin hair, constipation, shortness of breath, swelling, irregular periods  your doctor will do thyroid hormone blood tests at least once a year to monitor if medication dose is adequate  Newly diagnosed  take your thyroid medicine as directed by your doctor & on empty stomach

## 2017-08-31 ENCOUNTER — RESULT REVIEW (OUTPATIENT)
Age: 72
End: 2017-08-31

## 2017-08-31 ENCOUNTER — APPOINTMENT (OUTPATIENT)
Dept: RADIOLOGY | Facility: HOSPITAL | Age: 72
End: 2017-08-31

## 2017-08-31 ENCOUNTER — OUTPATIENT (OUTPATIENT)
Dept: OUTPATIENT SERVICES | Facility: HOSPITAL | Age: 72
LOS: 1 days | End: 2017-08-31
Payer: MEDICARE

## 2017-08-31 DIAGNOSIS — R05 COUGH: ICD-10-CM

## 2017-08-31 DIAGNOSIS — G03.8 MENINGITIS DUE TO OTHER SPECIFIED CAUSES: ICD-10-CM

## 2017-08-31 LAB
APPEARANCE CSF: CLEAR — SIGNIFICANT CHANGE UP
APPEARANCE SPUN FLD: COLORLESS — SIGNIFICANT CHANGE UP
COLOR CSF: SIGNIFICANT CHANGE UP
GLUCOSE CSF-MCNC: 68 MG/DL — SIGNIFICANT CHANGE UP (ref 40–70)
NEUTROPHILS # CSF: SIGNIFICANT CHANGE UP (ref 0–6)
NIGHT BLUE STAIN TISS: SIGNIFICANT CHANGE UP
NRBC NFR CSF: <1 — SIGNIFICANT CHANGE UP (ref 0–5)
PROT CSF-MCNC: 22 MG/DL — SIGNIFICANT CHANGE UP (ref 15–45)
RBC # CSF: 1 /UL — HIGH (ref 0–0)
SPECIMEN SOURCE: SIGNIFICANT CHANGE UP
TUBE TYPE: SIGNIFICANT CHANGE UP

## 2017-08-31 PROCEDURE — 87116 MYCOBACTERIA CULTURE: CPT

## 2017-08-31 PROCEDURE — 82945 GLUCOSE OTHER FLUID: CPT

## 2017-08-31 PROCEDURE — 83873 ASSAY OF CSF PROTEIN: CPT

## 2017-08-31 PROCEDURE — 89051 BODY FLUID CELL COUNT: CPT

## 2017-08-31 PROCEDURE — 84166 PROTEIN E-PHORESIS/URINE/CSF: CPT

## 2017-08-31 PROCEDURE — 62270 DX LMBR SPI PNXR: CPT

## 2017-08-31 PROCEDURE — 77003 FLUOROGUIDE FOR SPINE INJECT: CPT

## 2017-08-31 PROCEDURE — 86592 SYPHILIS TEST NON-TREP QUAL: CPT

## 2017-08-31 PROCEDURE — 87070 CULTURE OTHR SPECIMN AEROBIC: CPT

## 2017-08-31 PROCEDURE — 88108 CYTOPATH CONCENTRATE TECH: CPT | Mod: 26

## 2017-08-31 PROCEDURE — 88108 CYTOPATH CONCENTRATE TECH: CPT

## 2017-08-31 PROCEDURE — 87205 SMEAR GRAM STAIN: CPT

## 2017-08-31 PROCEDURE — 77003 FLUOROGUIDE FOR SPINE INJECT: CPT | Mod: 26

## 2017-08-31 PROCEDURE — 84157 ASSAY OF PROTEIN OTHER: CPT

## 2017-09-01 LAB
GRAM STN FLD: SIGNIFICANT CHANGE UP
NON-GYNECOLOGICAL CYTOLOGY STUDY: SIGNIFICANT CHANGE UP
PROT CSF-MCNC: 22 MG/DL — SIGNIFICANT CHANGE UP (ref 15–45)

## 2017-09-02 LAB — VDRL CSF-TITR: NEGATIVE — SIGNIFICANT CHANGE UP

## 2017-09-03 LAB
CULTURE RESULTS: NO GROWTH — SIGNIFICANT CHANGE UP
SPECIMEN SOURCE: SIGNIFICANT CHANGE UP

## 2017-09-05 LAB
ALBUMIN CSF-MCNC: 13.1 MG/DL — LOW (ref 14–25)
ALBUMIN SERPL ELPH-MCNC: 4100 MG/DL — SIGNIFICANT CHANGE UP (ref 3500–5200)
IGG CSF-MCNC: 2.5 MG/DL — SIGNIFICANT CHANGE UP
IGG FLD-MCNC: 1680 MG/DL — HIGH (ref 694–1618)
IGG SYNTH RATE SER+CSF CALC-MRATE: -6.1 MG/DAY — SIGNIFICANT CHANGE UP
IGG/ALB CLEAR SER+CSF-RTO: 0.5 — SIGNIFICANT CHANGE UP
IGG/ALB CSF: 0.19 RATIO — SIGNIFICANT CHANGE UP
IGG/ALB SER: 0.41 RATIO — SIGNIFICANT CHANGE UP

## 2017-09-07 LAB
OLIGOCLONAL BANDS CSF ELPH-IMP: SIGNIFICANT CHANGE UP
OLIGOCLONAL BANDS CSF ELPH-IMP: SIGNIFICANT CHANGE UP

## 2017-09-14 LAB — MBP CSF-MCNC: < 2 MCG/L — SIGNIFICANT CHANGE UP

## 2017-10-21 LAB
CULTURE RESULTS: SIGNIFICANT CHANGE UP
SPECIMEN SOURCE: SIGNIFICANT CHANGE UP

## 2018-05-03 NOTE — PROGRESS NOTE ADULT - PROBLEM/PLAN-2
Include Z78.9 (Other Specified Conditions Influencing Health Status) As An Associated Diagnosis?: No
Post-Care Instructions: I reviewed with the patient in detail post-care instructions. Patient is to wear sunprotection, and avoid picking at any of the treated lesions. Pt may apply Vaseline to crusted or scabbing areas.
DISPLAY PLAN FREE TEXT
Detail Level: Detailed
Medical Necessity Clause: This procedure was medically necessary because the lesions that were treated were:
Render Post-Care Instructions In Note?: yes
Consent: The patient's consent was obtained including but not limited to risks of crusting, scabbing, blistering, scarring, darker or lighter pigmentary change, recurrence, incomplete removal and infection.
Medical Necessity Information: It is in your best interest to select a reason for this procedure from the list below. All of these items fulfill various CMS LCD requirements except the new and changing color options.
Number Of Freeze-Thaw Cycles: 2 freeze-thaw cycles

## 2020-12-15 PROBLEM — Z87.09 HISTORY OF UPPER RESPIRATORY INFECTION: Status: RESOLVED | Noted: 2017-01-12 | Resolved: 2020-12-15

## 2022-02-25 NOTE — PATIENT PROFILE ADULT. - TEACHING/LEARNING FACTORS IMPACT ABILITY TO LEARN
Note Text (......Xxx Chief Complaint.): This diagnosis correlates with the
Other (Free Text): no suspicious lesions noted on skin examination
Render Risk Assessment In Note?: no
Detail Level: Simple
none

## 2023-11-08 NOTE — DISCHARGE NOTE ADULT - NS MD DC FALL RISK RISK
Routine safety standards initiated.  Routine nursing standards initiated. For information on Fall & Injury Prevention, visit [email or web address]

## 2024-10-18 NOTE — DISCHARGE NOTE ADULT - PATIENT PORTAL LINK FT
“You can access the FollowHealth Patient Portal, offered by [facility], by registering with the following website: [email or web address]” Opt out

## 2025-06-19 NOTE — H&P ADULT. - VASCULAR
Patient requests all Lab, Cardiology, and Radiology Results on their Discharge Instructions detailed exam